# Patient Record
Sex: FEMALE | Race: WHITE | NOT HISPANIC OR LATINO | Employment: OTHER | URBAN - METROPOLITAN AREA
[De-identification: names, ages, dates, MRNs, and addresses within clinical notes are randomized per-mention and may not be internally consistent; named-entity substitution may affect disease eponyms.]

---

## 2017-03-03 ENCOUNTER — HOSPITAL ENCOUNTER (OUTPATIENT)
Dept: MAMMOGRAPHY | Facility: CLINIC | Age: 47
Discharge: HOME | End: 2017-03-03
Attending: OBSTETRICS & GYNECOLOGY | Admitting: OBSTETRICS & GYNECOLOGY
Payer: COMMERCIAL

## 2017-03-03 DIAGNOSIS — Z12.31 VISIT FOR SCREENING MAMMOGRAM: ICD-10-CM

## 2017-03-03 PROCEDURE — 77063 BREAST TOMOSYNTHESIS BI: CPT

## 2018-04-30 ENCOUNTER — HOSPITAL ENCOUNTER (OUTPATIENT)
Dept: MAMMOGRAPHY | Facility: CLINIC | Age: 48
Discharge: HOME | End: 2018-04-30
Attending: OBSTETRICS & GYNECOLOGY | Admitting: OBSTETRICS & GYNECOLOGY
Payer: COMMERCIAL

## 2018-04-30 DIAGNOSIS — Z12.31 VISIT FOR SCREENING MAMMOGRAM: ICD-10-CM

## 2018-04-30 PROCEDURE — 77067 SCR MAMMO BI INCL CAD: CPT

## 2019-05-14 ENCOUNTER — HOSPITAL ENCOUNTER (OUTPATIENT)
Dept: MAMMOGRAPHY | Facility: CLINIC | Age: 49
Discharge: HOME | End: 2019-05-14
Attending: OBSTETRICS & GYNECOLOGY | Admitting: OBSTETRICS & GYNECOLOGY
Payer: COMMERCIAL

## 2019-05-14 DIAGNOSIS — Z12.31 VISIT FOR SCREENING MAMMOGRAM: ICD-10-CM

## 2019-05-14 PROCEDURE — 77063 BREAST TOMOSYNTHESIS BI: CPT

## 2020-09-14 ENCOUNTER — HOSPITAL ENCOUNTER (OUTPATIENT)
Dept: MAMMOGRAPHY | Facility: CLINIC | Age: 50
Discharge: HOME | End: 2020-09-14
Attending: OBSTETRICS & GYNECOLOGY | Admitting: OBSTETRICS & GYNECOLOGY
Payer: COMMERCIAL

## 2020-09-14 DIAGNOSIS — Z12.31 VISIT FOR SCREENING MAMMOGRAM: ICD-10-CM

## 2020-09-14 PROCEDURE — 77063 BREAST TOMOSYNTHESIS BI: CPT

## 2021-09-23 ENCOUNTER — OFFICE VISIT (OUTPATIENT)
Dept: SURGERY | Facility: CLINIC | Age: 51
End: 2021-09-23
Payer: COMMERCIAL

## 2021-09-23 VITALS
HEART RATE: 62 BPM | WEIGHT: 145 LBS | HEIGHT: 66 IN | DIASTOLIC BLOOD PRESSURE: 60 MMHG | SYSTOLIC BLOOD PRESSURE: 100 MMHG | BODY MASS INDEX: 23.3 KG/M2

## 2021-09-23 DIAGNOSIS — K42.9 UMBILICAL HERNIA WITHOUT OBSTRUCTION AND WITHOUT GANGRENE: ICD-10-CM

## 2021-09-23 DIAGNOSIS — M62.08 DIASTASIS RECTI: Primary | ICD-10-CM

## 2021-09-23 PROCEDURE — 99203 OFFICE O/P NEW LOW 30 MIN: CPT | Performed by: SURGERY

## 2021-09-23 ASSESSMENT — MIFFLIN-ST. JEOR: SCORE: 1299.47

## 2021-09-23 NOTE — PROGRESS NOTES
"[facility]  Surgery Consultation    CONSULTATION REQUESTED BY:  Collette Valdez [phone]    HPI: Patient is a very pleasant 51-year-old female referred by the above provider for consultation regarding umbilical hernia and diastases recti.  She has had 3 pregnancies all delivered via .  She feels that she developed the umbilical hernia with her last pregnancy which was approximately 8 years ago.  She feels that the hernia has gotten slowly larger over time.  In addition to this she has significant core insufficiency.  She has difficulty performing sit ups.  She has chronic low back pain.  She has discomfort and pain along the rectus abdominis bilaterally.  She has lack of confidence in her abdominal/core muscles.  She does not have any GI symptoms.  No signs or symptoms that would suggest incarceration or strangulation.    PMH:   has a past medical history of Complication of anesthesia and Postpartum depression.  PSH:    has a past surgical history that includes Thyroidectomy and  section, tubal ligation, combined (2013).  Social History:   reports that she has never smoked. She has never used smokeless tobacco. She reports that she does not drink alcohol.  Family History:  family history includes Breast Cancer in her sister; Colon Cancer in her maternal grandmother and mother; Hypertension in her brother and mother; Substance Abuse in her father.  Medications/Allergies: Home medications and allergies reviewed.    ROS:  The 10 point Review of Systems is negative other than noted in the HPI.    Physical Exam:  /60   Pulse 62   Ht 1.676 m (5' 6\")   Wt 65.8 kg (145 lb)   BMI 23.40 kg/m    GENERAL: Generally appears well.  Psych: Alert and Oriented.  Normal affect  Eyes: Sclera clear  Respiratory:  Lungs clear to ausculation bilaterally with good air excursion  Cardiovascular:  Regular Rate and Rhythm with no murmurs gallops or rubs, normal peripheral pulses  GI: " Abdomen Non Distended Soft Mild tenderness to palpation along the margins of the rectus abdominis.  There is significant diastases particularly periumbilical but extending to a degree into the epigastric as well as infraumbilical regions.  Umbilical hernia palpated.  Hernia easily reduciable..  Lymphatic/Hematologic/Immune:  No femoral or cervical lymphadenopathy.  Integumentary:  No rashes  Neurological: grossly intact     All new lab and imaging data was reviewed.     Impression and Plan:  Patient is a 51 year old female with umbilical hernia with symptomatic diastases.    PLAN: I believe patient would be best served with abdominal wall reconstruction.  She is interested in possibly doing this coordinated with hysterectomy.  I told her that this would depend upon the technique of surgery used for her hysterectomy.  She is going to discuss this with her gynecologist and was encouraged to call back to arrange for scheduling.  I discussed the pathophysiology of hernias and options for repair including laparoscopic VS open. The risks associated with the procedure including, but not limited to, recurrence, nerve entrapment or injury, persistence of pain, injury to the bowel/bladder, infertility, hematoma, mesh migration, mesh infection, MI, and PE were discussed with the patient. She indicated understanding of the discussion, asked appropriate questions, and provided consent. Signs and symptoms of incarceration were discussed. If these develop in the interim, she promises to call or go straight to the ER. I have provided the patient with an information pamphlet.    Thank you very much for this consult.    John Rosales M.D.  [facility]  [phone]    Please route or send letter to:  Primary Care Provider (PCP) and Referring Provider

## 2021-09-23 NOTE — LETTER
2021          Collette Valdez MD  [facility]  [address]  [id number]  [place] [address]      RE:   Nohelia Wong 1970      Dear Colleague,    Thank you for referring your patient, Nohelia Wong, to [facility]. Please see a copy of my visit note below.    [facility]  Surgery Consultation     CONSULTATION REQUESTED BY:  Collette Valdez [phone]     HPI: Patient is a very pleasant 51-year-old female referred by the above provider for consultation regarding umbilical hernia and diastases recti.  She has had 3 pregnancies all delivered via .  She feels that she developed the umbilical hernia with her last pregnancy which was approximately 8 years ago.  She feels that the hernia has gotten slowly larger over time.  In addition to this she has significant core insufficiency.  She has difficulty performing sit ups.  She has chronic low back pain.  She has discomfort and pain along the rectus abdominis bilaterally.  She has lack of confidence in her abdominal/core muscles.  She does not have any GI symptoms.  No signs or symptoms that would suggest incarceration or strangulation.     PMH:   has a past medical history of Complication of anesthesia and Postpartum depression.  PSH:    has a past surgical history that includes Thyroidectomy and  section, tubal ligation, combined (2013).  Social History:   reports that she has never smoked. She has never used smokeless tobacco. She reports that she does not drink alcohol.  Family History:  family history includes Breast Cancer in her sister; Colon Cancer in her maternal grandmother and mother; Hypertension in her brother and mother; Substance Abuse in her father.  Medications/Allergies: Home medications and allergies reviewed.     ROS:  The 10 point Review of Systems is negative other than noted in the HPI.     Physical Exam:  /60   Pulse 62   Ht 1.676  "m (5' 6\")   Wt 65.8 kg (145 lb)   BMI 23.40 kg/m    GENERAL: Generally appears well.  Psych: Alert and Oriented.  Normal affect  Eyes: Sclera clear  Respiratory:  Lungs clear to ausculation bilaterally with good air excursion  Cardiovascular:  Regular Rate and Rhythm with no murmurs gallops or rubs, normal peripheral pulses  GI: Abdomen Non Distended Soft Mild tenderness to palpation along the margins of the rectus abdominis.  There is significant diastases particularly periumbilical but extending to a degree into the epigastric as well as infraumbilical regions.  Umbilical hernia palpated.  Hernia easily reduciable..  Lymphatic/Hematologic/Immune:  No femoral or cervical lymphadenopathy.  Integumentary:  No rashes  Neurological: grossly intact      All new lab and imaging data was reviewed.      Impression and Plan:  Patient is a 51 year old female with umbilical hernia with symptomatic diastases.     PLAN: I believe patient would be best served with abdominal wall reconstruction.  She is interested in possibly doing this coordinated with hysterectomy.  I told her that this would depend upon the technique of surgery used for her hysterectomy.  She is going to discuss this with her gynecologist and was encouraged to call back to arrange for scheduling.  I discussed the pathophysiology of hernias and options for repair including laparoscopic VS open. The risks associated with the procedure including, but not limited to, recurrence, nerve entrapment or injury, persistence of pain, injury to the bowel/bladder, infertility, hematoma, mesh migration, mesh infection, MI, and PE were discussed with the patient. She indicated understanding of the discussion, asked appropriate questions, and provided consent. Signs and symptoms of incarceration were discussed. If these develop in the interim, she promises to call or go straight to the ER. I have provided the patient with an information pamphlet.       Again, thank you for " allowing me to participate in the care of your patient.      Sincerely,      John Rosales MD

## 2021-10-24 ENCOUNTER — TELEPHONE (OUTPATIENT)
Dept: SURGERY | Facility: CLINIC | Age: 51
End: 2021-10-24

## 2021-10-24 NOTE — TELEPHONE ENCOUNTER
Type of surgery: abdominal wall reconstruction combined with Dr Alonso robotic hysterectomy laith salpingo-oophorectomy  Location of surgery: [facility]  Date and time of surgery: 12/14/21 8:00am  Surgeon: Dr Rosales  Pre-Op Appt Date: pt to schedule  Post-Op Appt Date: pt to schedule   Packet sent out: Yes  Pre-cert/Authorization completed:  Not Applicable  Date: 10/15/21

## 2021-11-19 DIAGNOSIS — Z11.59 ENCOUNTER FOR SCREENING FOR OTHER VIRAL DISEASES: ICD-10-CM

## 2021-12-09 RX ORDER — ACETAMINOPHEN 325 MG/1
975 TABLET ORAL ONCE
Status: CANCELLED | OUTPATIENT
Start: 2021-12-09 | End: 2021-12-09

## 2021-12-09 RX ORDER — CEFAZOLIN SODIUM 2 G/100ML
2 INJECTION, SOLUTION INTRAVENOUS SEE ADMIN INSTRUCTIONS
Status: CANCELLED | OUTPATIENT
Start: 2021-12-09

## 2021-12-09 RX ORDER — CEFAZOLIN SODIUM 2 G/100ML
2 INJECTION, SOLUTION INTRAVENOUS
Status: CANCELLED | OUTPATIENT
Start: 2021-12-09

## 2021-12-10 ENCOUNTER — OFFICE VISIT (OUTPATIENT)
Dept: SURGERY | Facility: CLINIC | Age: 51
End: 2021-12-10
Payer: COMMERCIAL

## 2021-12-10 DIAGNOSIS — Z11.59 ENCOUNTER FOR SCREENING FOR OTHER VIRAL DISEASES: ICD-10-CM

## 2021-12-10 PROCEDURE — U0003 INFECTIOUS AGENT DETECTION BY NUCLEIC ACID (DNA OR RNA); SEVERE ACUTE RESPIRATORY SYNDROME CORONAVIRUS 2 (SARS-COV-2) (CORONAVIRUS DISEASE [COVID-19]), AMPLIFIED PROBE TECHNIQUE, MAKING USE OF HIGH THROUGHPUT TECHNOLOGIES AS DESCRIBED BY CMS-2020-01-R: HCPCS

## 2021-12-10 PROCEDURE — 99207 PR NO CHARGE NURSE ONLY: CPT

## 2021-12-10 PROCEDURE — U0005 INFEC AGEN DETEC AMPLI PROBE: HCPCS

## 2021-12-10 NOTE — PROGRESS NOTES
Patient seen in clinic for asymptomatic Pre-Surgery COVID test.    Patient swabbed via Nasopharyngeal Swab.  Specimen brought to hospital lab for  .    Patient educated to self-quarantine from now until surgery.

## 2021-12-11 LAB — SARS-COV-2 RNA RESP QL NAA+PROBE: NEGATIVE

## 2021-12-13 RX ORDER — NAPROXEN SODIUM 220 MG
220-440 TABLET ORAL 2 TIMES DAILY PRN
COMMUNITY

## 2021-12-13 RX ORDER — PHENOL 1.4 %
10 AEROSOL, SPRAY (ML) MUCOUS MEMBRANE
COMMUNITY

## 2021-12-13 RX ORDER — FERROUS SULFATE 325(65) MG
325 TABLET ORAL DAILY
COMMUNITY

## 2021-12-14 DIAGNOSIS — Z11.59 ENCOUNTER FOR SCREENING FOR OTHER VIRAL DISEASES: Primary | ICD-10-CM

## 2022-02-08 ENCOUNTER — TELEPHONE (OUTPATIENT)
Dept: SURGERY | Facility: CLINIC | Age: 52
End: 2022-02-08
Payer: COMMERCIAL

## 2022-02-08 NOTE — TELEPHONE ENCOUNTER
[facility]  Post op call note  February 8, 2022       Nohelia Wong called our clinic today for concerns about what to expect for recovery. Her  will be out of town 8-10 weeks following surgery and she is expected to take care of her mother-in-law. This includes lifts/assists. I recommended a back-up plan for assistance with her mother-in-law as she likely will still be very sore from surgery and deconditioned. She expressed understanding and will discuss this with her family.      Ingrid Harris PA-C  [facility]  [phone]

## 2022-02-08 NOTE — TELEPHONE ENCOUNTER
Name of caller: Patient    Reason for Call:  Scheduled for abdominal wall reconstruction on 3/21 and has questions about rehab.  Wondering what to expect for recovery and limitations    Surgeon:  Dr. Rosales     Recent Surgery:  No    If yes, when & what type:  N/A      Best phone number to reach pt at is: [phone]  Ok to leave a message with medical info? Yes.    Pharmacy preferred (if calling for a refill): N/A

## 2022-03-08 ENCOUNTER — TELEPHONE (OUTPATIENT)
Dept: SURGERY | Facility: CLINIC | Age: 52
End: 2022-03-08
Payer: COMMERCIAL

## 2022-03-08 NOTE — TELEPHONE ENCOUNTER
Name of caller: Patient    Reason for Call:  Scheduled for abdominal wall reconstruction on 3/21.  Wondering what to expect for recovery at home - wants to make sure her  is prepared to care for her    Surgeon:  Dr. Rosales     Best phone number to reach pt at is: [phone]    Ok to leave a message with medical info? Yes.

## 2022-03-08 NOTE — TELEPHONE ENCOUNTER
[facility]  Post op call note  March 8, 2022         Nohelia Wong called our clinic today for concerns about what to expect for recovery. She is educated that she may stay 1-2 days in the hospital following surgery from General Surgery standpoint. Here she may have her  as a visitor and he will receive drain education while in house. She will have a follow up with Dr. Rosales approximately 2 weeks from surgical date for drain removal. She is understanding that she has lifting restrictions following surgery and it would be smart to stay to main level of home 1-2 weeks following surgery for comfort.        Ingrid Harris PA-C  [facility]  [phone]

## 2022-03-17 ENCOUNTER — LAB (OUTPATIENT)
Dept: URGENT CARE | Facility: URGENT CARE | Age: 52
End: 2022-03-17
Attending: SURGERY
Payer: COMMERCIAL

## 2022-03-17 DIAGNOSIS — Z11.59 ENCOUNTER FOR SCREENING FOR OTHER VIRAL DISEASES: ICD-10-CM

## 2022-03-17 PROCEDURE — U0003 INFECTIOUS AGENT DETECTION BY NUCLEIC ACID (DNA OR RNA); SEVERE ACUTE RESPIRATORY SYNDROME CORONAVIRUS 2 (SARS-COV-2) (CORONAVIRUS DISEASE [COVID-19]), AMPLIFIED PROBE TECHNIQUE, MAKING USE OF HIGH THROUGHPUT TECHNOLOGIES AS DESCRIBED BY CMS-2020-01-R: HCPCS

## 2022-03-17 PROCEDURE — U0005 INFEC AGEN DETEC AMPLI PROBE: HCPCS

## 2022-03-18 LAB — SARS-COV-2 RNA RESP QL NAA+PROBE: NEGATIVE

## 2022-03-20 ENCOUNTER — ANESTHESIA EVENT (OUTPATIENT)
Dept: SURGERY | Facility: CLINIC | Age: 52
DRG: 743 | End: 2022-03-20
Payer: COMMERCIAL

## 2022-03-20 ASSESSMENT — COPD QUESTIONNAIRES: COPD: 0

## 2022-03-20 ASSESSMENT — LIFESTYLE VARIABLES: TOBACCO_USE: 0

## 2022-03-21 ENCOUNTER — ANESTHESIA (OUTPATIENT)
Dept: SURGERY | Facility: CLINIC | Age: 52
DRG: 743 | End: 2022-03-21
Payer: COMMERCIAL

## 2022-03-21 ENCOUNTER — HOSPITAL ENCOUNTER (INPATIENT)
Facility: CLINIC | Age: 52
LOS: 1 days | Discharge: HOME | DRG: 743 | End: 2022-03-23
Attending: SURGERY | Admitting: SURGERY
Payer: COMMERCIAL

## 2022-03-21 ENCOUNTER — APPOINTMENT (OUTPATIENT)
Dept: SURGERY | Facility: PHYSICIAN GROUP | Age: 52
End: 2022-03-21
Payer: COMMERCIAL

## 2022-03-21 DIAGNOSIS — K42.9 UMBILICAL HERNIA WITHOUT OBSTRUCTION AND WITHOUT GANGRENE: Primary | ICD-10-CM

## 2022-03-21 LAB
ABO/RH(D): NORMAL
ANTIBODY SCREEN: NEGATIVE
B-HCG SERPL-ACNC: <1 IU/L (ref 0–5)
CREAT SERPL-MCNC: 0.7 MG/DL (ref 0.52–1.04)
GFR SERPL CREATININE-BSD FRML MDRD: >90 ML/MIN/1.73M2
HGB BLD-MCNC: 9 G/DL (ref 11.7–15.7)
HGB BLD-MCNC: 9.2 G/DL (ref 11.7–15.7)
PLATELET # BLD AUTO: 178 10E3/UL (ref 150–450)
SPECIMEN EXPIRATION DATE: NORMAL

## 2022-03-21 PROCEDURE — 0UT94ZZ RESECTION OF UTERUS, PERCUTANEOUS ENDOSCOPIC APPROACH: ICD-10-PCS | Performed by: OBSTETRICS & GYNECOLOGY

## 2022-03-21 PROCEDURE — 999N000141 HC STATISTIC PRE-PROCEDURE NURSING ASSESSMENT: Performed by: SURGERY

## 2022-03-21 PROCEDURE — 250N000011 HC RX IP 250 OP 636: Performed by: ANESTHESIOLOGY

## 2022-03-21 PROCEDURE — 8E0W4CZ ROBOTIC ASSISTED PROCEDURE OF TRUNK REGION, PERCUTANEOUS ENDOSCOPIC APPROACH: ICD-10-PCS | Performed by: OBSTETRICS & GYNECOLOGY

## 2022-03-21 PROCEDURE — 85018 HEMOGLOBIN: CPT | Performed by: PHYSICIAN ASSISTANT

## 2022-03-21 PROCEDURE — 85049 AUTOMATED PLATELET COUNT: CPT | Performed by: SURGERY

## 2022-03-21 PROCEDURE — 0UT74ZZ RESECTION OF BILATERAL FALLOPIAN TUBES, PERCUTANEOUS ENDOSCOPIC APPROACH: ICD-10-PCS | Performed by: OBSTETRICS & GYNECOLOGY

## 2022-03-21 PROCEDURE — 86901 BLOOD TYPING SEROLOGIC RH(D): CPT | Performed by: OBSTETRICS & GYNECOLOGY

## 2022-03-21 PROCEDURE — 250N000011 HC RX IP 250 OP 636: Performed by: PHYSICIAN ASSISTANT

## 2022-03-21 PROCEDURE — 86850 RBC ANTIBODY SCREEN: CPT | Performed by: OBSTETRICS & GYNECOLOGY

## 2022-03-21 PROCEDURE — 82565 ASSAY OF CREATININE: CPT | Performed by: PHYSICIAN ASSISTANT

## 2022-03-21 PROCEDURE — C1781 MESH (IMPLANTABLE): HCPCS | Performed by: SURGERY

## 2022-03-21 PROCEDURE — 36415 COLL VENOUS BLD VENIPUNCTURE: CPT | Performed by: PHYSICIAN ASSISTANT

## 2022-03-21 PROCEDURE — 0UT24ZZ RESECTION OF BILATERAL OVARIES, PERCUTANEOUS ENDOSCOPIC APPROACH: ICD-10-PCS | Performed by: OBSTETRICS & GYNECOLOGY

## 2022-03-21 PROCEDURE — 258N000003 HC RX IP 258 OP 636: Performed by: ANESTHESIOLOGY

## 2022-03-21 PROCEDURE — 250N000009 HC RX 250: Performed by: ANESTHESIOLOGY

## 2022-03-21 PROCEDURE — 49585 PR REPAIR UMBILICAL HERN,5+Y/O,REDUC: CPT | Mod: AS | Performed by: PHYSICIAN ASSISTANT

## 2022-03-21 PROCEDURE — 250N000013 HC RX MED GY IP 250 OP 250 PS 637: Performed by: PHYSICIAN ASSISTANT

## 2022-03-21 PROCEDURE — 272N000001 HC OR GENERAL SUPPLY STERILE: Performed by: SURGERY

## 2022-03-21 PROCEDURE — 370N000017 HC ANESTHESIA TECHNICAL FEE, PER MIN: Performed by: SURGERY

## 2022-03-21 PROCEDURE — 250N000009 HC RX 250: Performed by: OBSTETRICS & GYNECOLOGY

## 2022-03-21 PROCEDURE — 360N000080 HC SURGERY LEVEL 7, PER MIN: Performed by: SURGERY

## 2022-03-21 PROCEDURE — 88307 TISSUE EXAM BY PATHOLOGIST: CPT | Mod: TC | Performed by: OBSTETRICS & GYNECOLOGY

## 2022-03-21 PROCEDURE — 49585 PR REPAIR UMBILICAL HERN,5+Y/O,REDUC: CPT | Performed by: SURGERY

## 2022-03-21 PROCEDURE — 0UTC4ZZ RESECTION OF CERVIX, PERCUTANEOUS ENDOSCOPIC APPROACH: ICD-10-PCS | Performed by: OBSTETRICS & GYNECOLOGY

## 2022-03-21 PROCEDURE — 710N000009 HC RECOVERY PHASE 1, LEVEL 1, PER MIN: Performed by: SURGERY

## 2022-03-21 PROCEDURE — 258N000001 HC RX 258: Performed by: OBSTETRICS & GYNECOLOGY

## 2022-03-21 PROCEDURE — 250N000025 HC SEVOFLURANE, PER MIN: Performed by: SURGERY

## 2022-03-21 PROCEDURE — 15734 MUSCLE-SKIN GRAFT TRUNK: CPT | Mod: 59 | Performed by: SURGERY

## 2022-03-21 PROCEDURE — 88307 TISSUE EXAM BY PATHOLOGIST: CPT | Mod: 26 | Performed by: PATHOLOGY

## 2022-03-21 PROCEDURE — 0WUF0JZ SUPPLEMENT ABDOMINAL WALL WITH SYNTHETIC SUBSTITUTE, OPEN APPROACH: ICD-10-PCS | Performed by: SURGERY

## 2022-03-21 PROCEDURE — 84702 CHORIONIC GONADOTROPIN TEST: CPT | Performed by: PHYSICIAN ASSISTANT

## 2022-03-21 DEVICE — COMPOSITE MESH,MONOFILAMENT POLYESTER WITH ABSORBABLE COLLAGEN FILM AND MARKING
Type: IMPLANTABLE DEVICE | Site: ABDOMEN | Status: FUNCTIONAL
Brand: SYMBOTEX

## 2022-03-21 RX ORDER — CEFAZOLIN SODIUM/WATER 2 G/20 ML
2 SYRINGE (ML) INTRAVENOUS
Status: COMPLETED | OUTPATIENT
Start: 2022-03-21 | End: 2022-03-21

## 2022-03-21 RX ORDER — NALOXONE HYDROCHLORIDE 0.4 MG/ML
0.4 INJECTION, SOLUTION INTRAMUSCULAR; INTRAVENOUS; SUBCUTANEOUS
Status: DISCONTINUED | OUTPATIENT
Start: 2022-03-21 | End: 2022-03-23 | Stop reason: HOSPADM

## 2022-03-21 RX ORDER — ACETAMINOPHEN 325 MG/1
975 TABLET ORAL EVERY 8 HOURS
Status: DISCONTINUED | OUTPATIENT
Start: 2022-03-21 | End: 2022-03-23 | Stop reason: HOSPADM

## 2022-03-21 RX ORDER — CEFAZOLIN SODIUM/WATER 2 G/20 ML
2 SYRINGE (ML) INTRAVENOUS
Status: DISCONTINUED | OUTPATIENT
Start: 2022-03-21 | End: 2022-03-21 | Stop reason: HOSPADM

## 2022-03-21 RX ORDER — CYCLOBENZAPRINE HCL 10 MG
10 TABLET ORAL 3 TIMES DAILY
Status: DISCONTINUED | OUTPATIENT
Start: 2022-03-21 | End: 2022-03-23 | Stop reason: HOSPADM

## 2022-03-21 RX ORDER — OXYCODONE HYDROCHLORIDE 5 MG/1
10 TABLET ORAL EVERY 4 HOURS PRN
Status: DISCONTINUED | OUTPATIENT
Start: 2022-03-21 | End: 2022-03-23 | Stop reason: HOSPADM

## 2022-03-21 RX ORDER — LIDOCAINE HYDROCHLORIDE 20 MG/ML
INJECTION, SOLUTION INFILTRATION; PERINEURAL PRN
Status: DISCONTINUED | OUTPATIENT
Start: 2022-03-21 | End: 2022-03-21

## 2022-03-21 RX ORDER — ONDANSETRON 4 MG/1
4 TABLET, ORALLY DISINTEGRATING ORAL EVERY 30 MIN PRN
Status: DISCONTINUED | OUTPATIENT
Start: 2022-03-21 | End: 2022-03-21 | Stop reason: HOSPADM

## 2022-03-21 RX ORDER — FAMOTIDINE 20 MG/1
20 TABLET, FILM COATED ORAL 2 TIMES DAILY
Status: DISCONTINUED | OUTPATIENT
Start: 2022-03-21 | End: 2022-03-23 | Stop reason: HOSPADM

## 2022-03-21 RX ORDER — SODIUM CHLORIDE, SODIUM LACTATE, POTASSIUM CHLORIDE, CALCIUM CHLORIDE 600; 310; 30; 20 MG/100ML; MG/100ML; MG/100ML; MG/100ML
INJECTION, SOLUTION INTRAVENOUS CONTINUOUS
Status: DISCONTINUED | OUTPATIENT
Start: 2022-03-21 | End: 2022-03-21 | Stop reason: HOSPADM

## 2022-03-21 RX ORDER — FENTANYL CITRATE 0.05 MG/ML
50 INJECTION, SOLUTION INTRAMUSCULAR; INTRAVENOUS
Status: DISCONTINUED | OUTPATIENT
Start: 2022-03-21 | End: 2022-03-21 | Stop reason: HOSPADM

## 2022-03-21 RX ORDER — CEFAZOLIN SODIUM 2 G/100ML
2 INJECTION, SOLUTION INTRAVENOUS EVERY 8 HOURS
Status: COMPLETED | OUTPATIENT
Start: 2022-03-21 | End: 2022-03-22

## 2022-03-21 RX ORDER — SODIUM CHLORIDE, SODIUM LACTATE, POTASSIUM CHLORIDE, CALCIUM CHLORIDE 600; 310; 30; 20 MG/100ML; MG/100ML; MG/100ML; MG/100ML
INJECTION, SOLUTION INTRAVENOUS CONTINUOUS
Status: DISCONTINUED | OUTPATIENT
Start: 2022-03-21 | End: 2022-03-22

## 2022-03-21 RX ORDER — AMOXICILLIN 250 MG
1 CAPSULE ORAL 2 TIMES DAILY
Status: DISCONTINUED | OUTPATIENT
Start: 2022-03-21 | End: 2022-03-23 | Stop reason: HOSPADM

## 2022-03-21 RX ORDER — LIDOCAINE 40 MG/G
CREAM TOPICAL
Status: DISCONTINUED | OUTPATIENT
Start: 2022-03-21 | End: 2022-03-23 | Stop reason: HOSPADM

## 2022-03-21 RX ORDER — HYDROMORPHONE HCL IN WATER/PF 6 MG/30 ML
0.4 PATIENT CONTROLLED ANALGESIA SYRINGE INTRAVENOUS
Status: DISCONTINUED | OUTPATIENT
Start: 2022-03-21 | End: 2022-03-23 | Stop reason: HOSPADM

## 2022-03-21 RX ORDER — KETOROLAC TROMETHAMINE 30 MG/ML
30 INJECTION, SOLUTION INTRAMUSCULAR; INTRAVENOUS EVERY 6 HOURS PRN
Status: DISCONTINUED | OUTPATIENT
Start: 2022-03-21 | End: 2022-03-23 | Stop reason: HOSPADM

## 2022-03-21 RX ORDER — BUPIVACAINE HYDROCHLORIDE 5 MG/ML
INJECTION, SOLUTION PERINEURAL PRN
Status: DISCONTINUED | OUTPATIENT
Start: 2022-03-21 | End: 2022-03-21 | Stop reason: HOSPADM

## 2022-03-21 RX ORDER — CEFAZOLIN SODIUM/WATER 2 G/20 ML
2 SYRINGE (ML) INTRAVENOUS SEE ADMIN INSTRUCTIONS
Status: DISCONTINUED | OUTPATIENT
Start: 2022-03-21 | End: 2022-03-21 | Stop reason: HOSPADM

## 2022-03-21 RX ORDER — HYDROMORPHONE HCL IN WATER/PF 6 MG/30 ML
0.2 PATIENT CONTROLLED ANALGESIA SYRINGE INTRAVENOUS EVERY 5 MIN PRN
Status: DISCONTINUED | OUTPATIENT
Start: 2022-03-21 | End: 2022-03-21 | Stop reason: HOSPADM

## 2022-03-21 RX ORDER — LEVOTHYROXINE SODIUM 100 UG/1
100 TABLET ORAL
Status: DISCONTINUED | OUTPATIENT
Start: 2022-03-21 | End: 2022-03-23 | Stop reason: HOSPADM

## 2022-03-21 RX ORDER — PROPOFOL 10 MG/ML
INJECTION, EMULSION INTRAVENOUS CONTINUOUS PRN
Status: DISCONTINUED | OUTPATIENT
Start: 2022-03-21 | End: 2022-03-21

## 2022-03-21 RX ORDER — FENTANYL CITRATE 0.05 MG/ML
25 INJECTION, SOLUTION INTRAMUSCULAR; INTRAVENOUS EVERY 5 MIN PRN
Status: DISCONTINUED | OUTPATIENT
Start: 2022-03-21 | End: 2022-03-21 | Stop reason: HOSPADM

## 2022-03-21 RX ORDER — HYDROMORPHONE HCL IN WATER/PF 6 MG/30 ML
0.2 PATIENT CONTROLLED ANALGESIA SYRINGE INTRAVENOUS
Status: DISCONTINUED | OUTPATIENT
Start: 2022-03-21 | End: 2022-03-23 | Stop reason: HOSPADM

## 2022-03-21 RX ORDER — ONDANSETRON 2 MG/ML
4 INJECTION INTRAMUSCULAR; INTRAVENOUS EVERY 6 HOURS PRN
Status: DISCONTINUED | OUTPATIENT
Start: 2022-03-21 | End: 2022-03-23 | Stop reason: HOSPADM

## 2022-03-21 RX ORDER — ONDANSETRON 4 MG/1
4 TABLET, ORALLY DISINTEGRATING ORAL EVERY 6 HOURS PRN
Status: DISCONTINUED | OUTPATIENT
Start: 2022-03-21 | End: 2022-03-23 | Stop reason: HOSPADM

## 2022-03-21 RX ORDER — OXYCODONE HYDROCHLORIDE 5 MG/1
5 TABLET ORAL EVERY 4 HOURS PRN
Status: DISCONTINUED | OUTPATIENT
Start: 2022-03-21 | End: 2022-03-23 | Stop reason: HOSPADM

## 2022-03-21 RX ORDER — ONDANSETRON 2 MG/ML
4 INJECTION INTRAMUSCULAR; INTRAVENOUS EVERY 30 MIN PRN
Status: DISCONTINUED | OUTPATIENT
Start: 2022-03-21 | End: 2022-03-21 | Stop reason: HOSPADM

## 2022-03-21 RX ORDER — GLYCOPYRROLATE 0.2 MG/ML
INJECTION, SOLUTION INTRAMUSCULAR; INTRAVENOUS PRN
Status: DISCONTINUED | OUTPATIENT
Start: 2022-03-21 | End: 2022-03-21

## 2022-03-21 RX ORDER — DIAZEPAM 10 MG/2ML
2.5 INJECTION, SOLUTION INTRAMUSCULAR; INTRAVENOUS EVERY 4 HOURS PRN
Status: DISCONTINUED | OUTPATIENT
Start: 2022-03-21 | End: 2022-03-22

## 2022-03-21 RX ORDER — NEOSTIGMINE METHYLSULFATE 1 MG/ML
VIAL (ML) INJECTION PRN
Status: DISCONTINUED | OUTPATIENT
Start: 2022-03-21 | End: 2022-03-21

## 2022-03-21 RX ORDER — OXYCODONE HYDROCHLORIDE 5 MG/1
5 TABLET ORAL EVERY 4 HOURS PRN
Status: DISCONTINUED | OUTPATIENT
Start: 2022-03-21 | End: 2022-03-21 | Stop reason: HOSPADM

## 2022-03-21 RX ORDER — PROCHLORPERAZINE MALEATE 10 MG
10 TABLET ORAL EVERY 6 HOURS PRN
Status: DISCONTINUED | OUTPATIENT
Start: 2022-03-21 | End: 2022-03-23 | Stop reason: HOSPADM

## 2022-03-21 RX ORDER — PROPOFOL 10 MG/ML
INJECTION, EMULSION INTRAVENOUS PRN
Status: DISCONTINUED | OUTPATIENT
Start: 2022-03-21 | End: 2022-03-21

## 2022-03-21 RX ORDER — ACETAMINOPHEN 325 MG/1
650 TABLET ORAL EVERY 4 HOURS PRN
Status: DISCONTINUED | OUTPATIENT
Start: 2022-03-24 | End: 2022-03-23 | Stop reason: HOSPADM

## 2022-03-21 RX ORDER — DEXAMETHASONE SODIUM PHOSPHATE 4 MG/ML
INJECTION, SOLUTION INTRA-ARTICULAR; INTRALESIONAL; INTRAMUSCULAR; INTRAVENOUS; SOFT TISSUE PRN
Status: DISCONTINUED | OUTPATIENT
Start: 2022-03-21 | End: 2022-03-21

## 2022-03-21 RX ORDER — ACETAMINOPHEN 325 MG/1
975 TABLET ORAL ONCE
Status: COMPLETED | OUTPATIENT
Start: 2022-03-21 | End: 2022-03-21

## 2022-03-21 RX ORDER — NALOXONE HYDROCHLORIDE 0.4 MG/ML
0.2 INJECTION, SOLUTION INTRAMUSCULAR; INTRAVENOUS; SUBCUTANEOUS
Status: DISCONTINUED | OUTPATIENT
Start: 2022-03-21 | End: 2022-03-23 | Stop reason: HOSPADM

## 2022-03-21 RX ORDER — BISACODYL 10 MG
10 SUPPOSITORY, RECTAL RECTAL DAILY PRN
Status: DISCONTINUED | OUTPATIENT
Start: 2022-03-21 | End: 2022-03-23 | Stop reason: HOSPADM

## 2022-03-21 RX ORDER — SCOLOPAMINE TRANSDERMAL SYSTEM 1 MG/1
1 PATCH, EXTENDED RELEASE TRANSDERMAL ONCE
Status: COMPLETED | OUTPATIENT
Start: 2022-03-21 | End: 2022-03-22

## 2022-03-21 RX ORDER — MAGNESIUM HYDROXIDE 1200 MG/15ML
LIQUID ORAL PRN
Status: DISCONTINUED | OUTPATIENT
Start: 2022-03-21 | End: 2022-03-21 | Stop reason: HOSPADM

## 2022-03-21 RX ORDER — FENTANYL CITRATE 50 UG/ML
INJECTION, SOLUTION INTRAMUSCULAR; INTRAVENOUS PRN
Status: DISCONTINUED | OUTPATIENT
Start: 2022-03-21 | End: 2022-03-21

## 2022-03-21 RX ORDER — POLYETHYLENE GLYCOL 3350 17 G/17G
17 POWDER, FOR SOLUTION ORAL DAILY
Status: DISCONTINUED | OUTPATIENT
Start: 2022-03-22 | End: 2022-03-23 | Stop reason: HOSPADM

## 2022-03-21 RX ORDER — ONDANSETRON 2 MG/ML
INJECTION INTRAMUSCULAR; INTRAVENOUS PRN
Status: DISCONTINUED | OUTPATIENT
Start: 2022-03-21 | End: 2022-03-21

## 2022-03-21 RX ADMIN — ACETAMINOPHEN 975 MG: 325 TABLET, FILM COATED ORAL at 15:51

## 2022-03-21 RX ADMIN — GLYCOPYRROLATE 0.7 MG: 0.2 INJECTION, SOLUTION INTRAMUSCULAR; INTRAVENOUS at 10:14

## 2022-03-21 RX ADMIN — PHENYLEPHRINE HYDROCHLORIDE 100 MCG: 10 INJECTION INTRAVENOUS at 09:30

## 2022-03-21 RX ADMIN — CYCLOBENZAPRINE 10 MG: 10 TABLET, FILM COATED ORAL at 19:50

## 2022-03-21 RX ADMIN — HYDROMORPHONE HYDROCHLORIDE 0.2 MG: 0.2 INJECTION, SOLUTION INTRAMUSCULAR; INTRAVENOUS; SUBCUTANEOUS at 15:57

## 2022-03-21 RX ADMIN — DEXAMETHASONE SODIUM PHOSPHATE 4 MG: 4 INJECTION, SOLUTION INTRA-ARTICULAR; INTRALESIONAL; INTRAMUSCULAR; INTRAVENOUS; SOFT TISSUE at 07:57

## 2022-03-21 RX ADMIN — FAMOTIDINE 20 MG: 20 TABLET ORAL at 21:50

## 2022-03-21 RX ADMIN — OXYCODONE HYDROCHLORIDE 10 MG: 5 TABLET ORAL at 19:50

## 2022-03-21 RX ADMIN — FENTANYL CITRATE 25 MCG: 50 INJECTION, SOLUTION INTRAMUSCULAR; INTRAVENOUS at 11:41

## 2022-03-21 RX ADMIN — ROCURONIUM BROMIDE 20 MG: 50 INJECTION, SOLUTION INTRAVENOUS at 08:47

## 2022-03-21 RX ADMIN — ROCURONIUM BROMIDE 10 MG: 50 INJECTION, SOLUTION INTRAVENOUS at 09:30

## 2022-03-21 RX ADMIN — PHENYLEPHRINE HYDROCHLORIDE 100 MCG: 10 INJECTION INTRAVENOUS at 08:03

## 2022-03-21 RX ADMIN — OXYCODONE HYDROCHLORIDE 10 MG: 5 TABLET ORAL at 15:50

## 2022-03-21 RX ADMIN — HYDROMORPHONE HYDROCHLORIDE 0.2 MG: 0.2 INJECTION, SOLUTION INTRAMUSCULAR; INTRAVENOUS; SUBCUTANEOUS at 12:06

## 2022-03-21 RX ADMIN — PROPOFOL 25 MCG/KG/MIN: 10 INJECTION, EMULSION INTRAVENOUS at 07:46

## 2022-03-21 RX ADMIN — CYCLOBENZAPRINE 10 MG: 10 TABLET, FILM COATED ORAL at 15:51

## 2022-03-21 RX ADMIN — ROCURONIUM BROMIDE 20 MG: 50 INJECTION, SOLUTION INTRAVENOUS at 09:01

## 2022-03-21 RX ADMIN — CYCLOBENZAPRINE 10 MG: 10 TABLET, FILM COATED ORAL at 12:17

## 2022-03-21 RX ADMIN — SENNOSIDES AND DOCUSATE SODIUM 1 TABLET: 50; 8.6 TABLET ORAL at 21:50

## 2022-03-21 RX ADMIN — SCOPALAMINE 1 PATCH: 1 PATCH, EXTENDED RELEASE TRANSDERMAL at 07:11

## 2022-03-21 RX ADMIN — KETOROLAC TROMETHAMINE 30 MG: 30 INJECTION, SOLUTION INTRAMUSCULAR; INTRAVENOUS at 12:12

## 2022-03-21 RX ADMIN — FENTANYL CITRATE 25 MCG: 50 INJECTION, SOLUTION INTRAMUSCULAR; INTRAVENOUS at 11:48

## 2022-03-21 RX ADMIN — SODIUM CHLORIDE, POTASSIUM CHLORIDE, SODIUM LACTATE AND CALCIUM CHLORIDE: 600; 310; 30; 20 INJECTION, SOLUTION INTRAVENOUS at 06:18

## 2022-03-21 RX ADMIN — ACETAMINOPHEN 975 MG: 325 TABLET ORAL at 06:21

## 2022-03-21 RX ADMIN — PROPOFOL 200 MG: 10 INJECTION, EMULSION INTRAVENOUS at 07:46

## 2022-03-21 RX ADMIN — MIDAZOLAM 2 MG: 1 INJECTION INTRAMUSCULAR; INTRAVENOUS at 07:37

## 2022-03-21 RX ADMIN — ROCURONIUM BROMIDE 10 MG: 50 INJECTION, SOLUTION INTRAVENOUS at 09:45

## 2022-03-21 RX ADMIN — HYDROMORPHONE HYDROCHLORIDE 0.5 MG: 1 INJECTION, SOLUTION INTRAMUSCULAR; INTRAVENOUS; SUBCUTANEOUS at 09:02

## 2022-03-21 RX ADMIN — ROCURONIUM BROMIDE 10 MG: 50 INJECTION, SOLUTION INTRAVENOUS at 09:15

## 2022-03-21 RX ADMIN — DEXMEDETOMIDINE HYDROCHLORIDE 12 MCG: 100 INJECTION, SOLUTION INTRAVENOUS at 08:08

## 2022-03-21 RX ADMIN — ONDANSETRON 4 MG: 2 INJECTION INTRAMUSCULAR; INTRAVENOUS at 10:50

## 2022-03-21 RX ADMIN — FENTANYL CITRATE 25 MCG: 50 INJECTION, SOLUTION INTRAMUSCULAR; INTRAVENOUS at 11:36

## 2022-03-21 RX ADMIN — CEFAZOLIN SODIUM 2 G: 2 INJECTION, SOLUTION INTRAVENOUS at 23:40

## 2022-03-21 RX ADMIN — PHENYLEPHRINE HYDROCHLORIDE 100 MCG: 10 INJECTION INTRAVENOUS at 09:38

## 2022-03-21 RX ADMIN — KETOROLAC TROMETHAMINE 30 MG: 30 INJECTION, SOLUTION INTRAMUSCULAR; INTRAVENOUS at 19:49

## 2022-03-21 RX ADMIN — Medication 2 G: at 07:37

## 2022-03-21 RX ADMIN — ROCURONIUM BROMIDE 50 MG: 50 INJECTION, SOLUTION INTRAVENOUS at 07:46

## 2022-03-21 RX ADMIN — FENTANYL CITRATE 25 MCG: 50 INJECTION, SOLUTION INTRAMUSCULAR; INTRAVENOUS at 11:53

## 2022-03-21 RX ADMIN — PHENYLEPHRINE HYDROCHLORIDE 100 MCG: 10 INJECTION INTRAVENOUS at 10:14

## 2022-03-21 RX ADMIN — SODIUM CHLORIDE, POTASSIUM CHLORIDE, SODIUM LACTATE AND CALCIUM CHLORIDE: 600; 310; 30; 20 INJECTION, SOLUTION INTRAVENOUS at 09:51

## 2022-03-21 RX ADMIN — HYDROMORPHONE HYDROCHLORIDE 0.2 MG: 0.2 INJECTION, SOLUTION INTRAMUSCULAR; INTRAVENOUS; SUBCUTANEOUS at 13:26

## 2022-03-21 RX ADMIN — SODIUM CHLORIDE, POTASSIUM CHLORIDE, SODIUM LACTATE AND CALCIUM CHLORIDE: 600; 310; 30; 20 INJECTION, SOLUTION INTRAVENOUS at 12:38

## 2022-03-21 RX ADMIN — DEXMEDETOMIDINE HYDROCHLORIDE 8 MCG: 100 INJECTION, SOLUTION INTRAVENOUS at 10:34

## 2022-03-21 RX ADMIN — LIDOCAINE HYDROCHLORIDE 100 MG: 20 INJECTION, SOLUTION INFILTRATION; PERINEURAL at 07:46

## 2022-03-21 RX ADMIN — PHENYLEPHRINE HYDROCHLORIDE 100 MCG: 10 INJECTION INTRAVENOUS at 07:56

## 2022-03-21 RX ADMIN — CEFAZOLIN SODIUM 2 G: 2 INJECTION, SOLUTION INTRAVENOUS at 15:51

## 2022-03-21 RX ADMIN — HYDROMORPHONE HYDROCHLORIDE 0.2 MG: 0.2 INJECTION, SOLUTION INTRAMUSCULAR; INTRAVENOUS; SUBCUTANEOUS at 22:27

## 2022-03-21 RX ADMIN — NEOSTIGMINE METHYLSULFATE 3.5 MG: 1 INJECTION, SOLUTION INTRAVENOUS at 10:14

## 2022-03-21 RX ADMIN — LEVOTHYROXINE SODIUM 100 MCG: 100 TABLET ORAL at 15:58

## 2022-03-21 RX ADMIN — ACETAMINOPHEN 975 MG: 325 TABLET, FILM COATED ORAL at 23:40

## 2022-03-21 RX ADMIN — ROCURONIUM BROMIDE 30 MG: 50 INJECTION, SOLUTION INTRAVENOUS at 08:13

## 2022-03-21 RX ADMIN — FENTANYL CITRATE 100 MCG: 50 INJECTION, SOLUTION INTRAMUSCULAR; INTRAVENOUS at 07:46

## 2022-03-21 RX ADMIN — HYDROMORPHONE HYDROCHLORIDE 0.2 MG: 0.2 INJECTION, SOLUTION INTRAMUSCULAR; INTRAVENOUS; SUBCUTANEOUS at 12:00

## 2022-03-21 NOTE — INTERVAL H&P NOTE
I have reviewed the surgical (or preoperative) H&P that is linked to this encounter, and examined the patient. There are no significant changes    Clinical Conditions Present on Arrival:  SECTIONPRESENTONADMISSIONBEGIN@

## 2022-03-21 NOTE — OP NOTE
Procedure Date: 2022    PREOPERATIVE DIAGNOSIS:  Symptomatic fibroid uterus.    POSTOPERATIVE DIAGNOSES:  Symptomatic fibroid uterus.    PROCEDURE PERFORMED:  Da Ihsan total laparoscopic hysterectomy, bilateral salpingo-oophorectomy.    SURGEON:  Cory Alonso MD    ASSISTANT:  Nighat Gamboa.    ANESTHESIA:  General.    ESTIMATED BLOOD LOSS:  20 mL    COMPLICATIONS:  None.    SPECIMENS:  Fibroid uterus with tubes and ovaries sent to pathology for permanent section.    FINDINGS:  The patient had some M-ensqyos-uzkeqlu scarring and had a left ovarian tubal cyst that appeared benign.  She had a posterior wall intramural fibroid that had a spongy consistency and could be partially necrosed.  Regardless, we were able to pull the specimen through the vagina without incident, and the vagina was closed, and she is ready for the second part of her procedure, which will be abdominal wall reconstruction with mesh in the hands of Dr. John Galloway.    DESCRIPTION OF PROCEDURE:  After obtaining informed consent, the patient was prepped and draped in the usual manner for an abdominal vaginal procedure.  A Caicedo catheter was inserted in the bladder and a VCare device was inserted in the uterus.  Gloves were changed and attention was turned to the abdomen.  We created an 8 mm umbilical skin incision with a Veress needle, insufflated to a pressure of 15.  I then placed the da Ihsan camera port, and under direct visualization, with my assistant, placed bilateral da Ihsan ports 10 cm from midline to the right and left.  The da Ihsan robot was then placed in 29 degrees Trendelenburg and the da Ihsan robot was docked.  My assistant placed hot brigida in the right hand and a fenestrated bipolar grasper on the left hand as I took my place at the operative console.    We located the left infundibulopelvic artery and vein and could easily see the left ureter.  We sealed and cut the IP ligament on the left using the fenestrated  bipolar grasper and then the scissors.  We worked stepwise until we made it through the left round ligament at its midpoint.  Once we were through the round ligament, we were in the broad ligament and we  this with blunt dissection into anterior and posterior leaves.  The posterior leaf was cut down to the cervical isthmus, and we carried this dissection across the front of the specimen and worked our way through the  scarring until we could see the white of the vaginal fascia.  I then swept the bladder flap down off of the VCare cup ring with my assistant moving the VCare cup ring and assisting me in that way.  I then cut the posterior leaf of the broad ligament down to the left uterosacral ligament.  This skeletonized the vasculature, which was sealed and cut to the cervical isthmus and then we sealed and cut the left parametria down to the VCare cup ring.  Isolated bleeders were electrocoagulated.  We then did a similar dissection on the patient's right side and without difficulty.  The bladder had been swept all the way down off the VCare cup ring.  We elevated the uterus and I held it up with my fenestrated bipolar grasper and was able to clearly see in the posterior fornix the delineation of the VCare cup.  I then cut against this, and we worked our way around the specimen until it was freed from the apex of the vagina.  I then had my assistant remove the VCare and insert a Mccollum single tooth tenaculum.  I passed the cervix into the tenaculum and my assistant was able to pull the specimen through the vagina.  Next, I attended to isolated bleeders at the cuff, and we did copious irrigation and suction.  Then my assistant passed a 2-0 Covidien V-Loc through the right armature and inserted the denton suture cut.  We closed the fascia of the cuff and reapproximated epithelium left to right in a running stitch.  I then used the remainder of the stitch to work right to left to reperitonealize and  reinforce the fascial closure.  Excess stitch was then cut at the left aspect of the cuff, and the needle was sent off the field.  Copious irrigation and suction occurred, and finding no further bleeders, the procedure was complete.  Pneumoperitoneum was let down.  Trocars were removed, and Dr. Galloway had asked us not to close any of the upper abdominal incision points.  He will address those as part of his abdominal reconstruction procedure.  The patient was being redraped in a supine position for the initiation of that case, which will follow this case.  I refer you to his operative note for further details.    DISPOSITION:  The patient is in satisfactory stable condition and is in recovery.    Cory Alonso MD        D: 2022   T: 2022   MT: NOE    Name:     ZENIA BLAIR  MRN:      [id number]        Account:        [id number]   :      1970           Procedure Date: 2022     Document: [id number]

## 2022-03-21 NOTE — DISCHARGE INSTRUCTIONS
[facility]  Discharge Instructions: Post-Operative Abdominal Wall Reconstruction    ACTIVITY    Take frequent, short walks and increase your activity gradually.      Avoid strenuous physical activity or heavy lifting greater than 15-20 lbs. for 4-6 weeks. You may climb stairs.     You may drive without restrictions when you are not using any prescription pain medication and feel comfortable in a car.    You may return to work/school when you are comfortable without any prescription pain medication.    You can wear the abdominal binder 24 hours per day for the first 3 days postoperatively. After the first 3 days you can wear the binder with activity or as you feel comfortable for the next several weeks. You can wash the abdominal binder in your washing machine and dry it on low heat in the dryer.     WOUND CARE    You may remove your outer dressing and shower 48 hours after the surgery.  Pat your incisions dry and leave them open to air.  Re-apply dressing (gauze/tape) as needed for comfort or drainage.    You have steri-strips (looks like white tape) at your incisions.  You may peel off the steri-strips 2 weeks after your surgery if they have not peeled off on their own.    Do not soak your incisions in a tub or pool for 2 weeks.     Do not apply any lotions, creams, or ointments to your incisions.    A ridge under your incisions is normal and will gradually resolve.    Wear the abdominal binder 24 hours per day for the first 3 days, then as you like for comfort.    DRAIN CARE    You have 2 TERRIE drains in place. In order to empty the drain, open the tab/vent on the drain and record how much liquid you remove. To properly close the drain, squeeze the bulb (with tab/vent open) then close the tab while still squeezing the bulb. You should notice that liquid moves in the tubing toward the bulb if it is properly closed.     You should also strip the drain tubing once daily to avoid  any clogging. You do this by gently pinching the drain, starting near the skin, and stretching the tubing out this way until you reach the bulb. Do not pull hard on the drain tubing to the point of pulling the drain away from the skin.    DIET    Start with liquids, then gradually resume your regular diet as tolerated.     Drink plenty of fluids to stay hydrated.    PAIN    Expect tenderness and discomfort at the incision site(s).  Use the prescribed pain medication at your discretion.  Expect gradual resolution of your pain over several days.    You may take ibuprofen with food (unless you have been told not to) instead of or in addition to your prescribed pain medication.      You also should take tylenol/acetaminophen in addition to your prescribed pain medication. Gradually transition off the prescription pain medication to Tylenol and/or Ibuprofen    Do not drink alcohol or drive while you are taking pain medications.    You may apply ice to your incisions in 20 minute intervals as needed for the next 48 hours.  After that time, you may switch to heat if you prefer.    EXPECTATIONS    Pain medications can cause constipation.  Limit use when possible.  Take over the counter stool softener/stimulant, such as Colace or Senna, 1-2 times a day with plenty of water.  You may take a mild over the counter laxative, such as Miralax or a suppository, as needed.      You may discontinue these medications once you are having regular bowel movements and/or are no longer taking your narcotic pain medication.    RETURN APPOINTMENT    Follow-up with your surgeon next week for recheck and drain removal. If your drain is ready to be removed before that time, please call to make an appointment sooner.  Please call our office at [phone] to schedule your appointment.    CALL OUR OFFICE [phone] IF YOU HAVE:     Chills or fever above 101 F.    Increased redness, warmth, or drainage at your incisions.    Significant  bleeding or swelling.    Pain not relieved by your pain medication or rest.    Increasing pain after the first 48 hours.    Any other concerns or questions.    **If you have concerns or questions about your procedure,    please contact Dr. Rosales at  [phone]**

## 2022-03-21 NOTE — PROGRESS NOTES
[facility]    General Surgery  Short Progress Note    Nohelia Wong underwent abdominal wall reconstruction with mesh, elevation of the skin and subcutaneous flaps by Dr. Rosales and robotic assisted total hysterectomy and bilateral salpingo-oophorectomy by Dr. Alonso. Once patient up and moving well, okay to remove greenberg today. Possible discharge as early as tomorrow if pain controlled with oral analgesia, tolerating diet, and vitals wnl.    Ingrid Harris PA-C

## 2022-03-21 NOTE — OR NURSING
Dr Milind andrews, updated on pt having pain. Ok to give Toradol 30 iv and give flexeril 10 mg tab. Eva reports having constant cramping and rates pain at at 5-6. Ok to transfer pt to floor when she reports pain at a 4.

## 2022-03-21 NOTE — ANESTHESIA PREPROCEDURE EVALUATION
Anesthesia Pre-Procedure Evaluation    Patient: Nohelia Wong   MRN: [id number] : 1970        Procedure : Procedure(s):  RECONSTRUCTION, ABDOMINAL WALL  ROBOTIC ASSISTED TOTAL HYSTERECTOMY, BILATERAL SALPINGO-OOPHORECTOMY          Past Medical History:   Diagnosis Date     Complication of anesthesia     vomiting and HA with Thyroid     Postpartum depression     first pregnancy      Past Surgical History:   Procedure Laterality Date      SECTION, TUBAL LIGATION, COMBINED  2013    Procedure: COMBINED  SECTION, TUBAL LIGATION;  REPEAT  SECTION WITH TUBAL LIGATION;  Surgeon: Collette Valdez MD;  Location:  L+D     THYROIDECTOMY        Allergies   Allergen Reactions     No Known Drug Allergies       Social History     Tobacco Use     Smoking status: Never Smoker     Smokeless tobacco: Never Used   Substance Use Topics     Alcohol use: No      Wt Readings from Last 1 Encounters:   21 65.8 kg (145 lb)        Anesthesia Evaluation   Pt has had prior anesthetic. Type: General.    History of anesthetic complications  - PONV.      ROS/MED HX  ENT/Pulmonary:    (-) tobacco use, asthma, COPD and sleep apnea   Neurologic:  - neg neurologic ROS     Cardiovascular:  - neg cardiovascular ROS     METS/Exercise Tolerance: >4 METS    Hematologic:     (+) anemia,     Musculoskeletal:       GI/Hepatic:    (-) GERD and liver disease   Renal/Genitourinary:    (-) renal disease   Endo:     (+) thyroid problem, hypothyroidism,  (-) Type I DM and Type II DM   Psychiatric/Substance Use:       Infectious Disease:       Malignancy:       Other:            Physical Exam    Airway        Mallampati: II   TM distance: > 3 FB   Neck ROM: full   Mouth opening: > 3 cm    Respiratory Devices and Support         Dental  no notable dental history         Cardiovascular   cardiovascular exam normal          Pulmonary   pulmonary exam normal                OUTSIDE LABS:  CBC:   Lab Results   Component  Value Date    HGB 9.9 (L) 01/08/2013    HGB 12.0 01/07/2013    HCT 40.6 06/13/2012     K 06/13/2012     07/29/2005     BMP: No results found for: NA, POTASSIUM, CHLORIDE, CO2, BUN, CR, GLC  COAGS: No results found for: PTT, INR, FIBR  POC: No results found for: BGM, HCG, HCGS  HEPATIC: No results found for: ALBUMIN, PROTTOTAL, ALT, AST, GGT, ALKPHOS, BILITOTAL, BILIDIRECT, DEVORAH  OTHER:   Lab Results   Component Value Date    KRISTEN 7.9 (L) 08/27/2008    TSH 2.43 10/23/2012       Anesthesia Plan    ASA Status:  1   NPO Status:  NPO Appropriate    Anesthesia Type: General.     - Airway: ETT   Induction: Intravenous.   Maintenance: Balanced.   Techniques and Equipment:     - Lines/Monitors: 2nd IV     Consents    Anesthesia Plan(s) and associated risks, benefits, and realistic alternatives discussed. Questions answered and patient/representative(s) expressed understanding.    - Discussed:     - Discussed with:  Patient      - Extended Intubation/Ventilatory Support Discussed: No.      - Patient is DNR/DNI Status: No    Use of blood products discussed: No .     Postoperative Care    Pain management: IV analgesics, Multi-modal analgesia.     - Plan for long acting post-op opioid use   PONV prophylaxis: Ondansetron (or other 5HT-3), Dexamethasone or Solumedrol, Background Propofol Infusion, Scopolamine patch     Comments:    Other Comments: Hgb 10.7            Aly Vaz MD

## 2022-03-21 NOTE — ANESTHESIA POSTPROCEDURE EVALUATION
Patient: Nohelia Wong    Procedure: Procedure(s):  RECONSTRUCTION, ABDOMINAL WALL  ROBOTIC ASSISTED TOTAL HYSTERECTOMY, BILATERAL SALPINGO-OOPHORECTOMY       Anesthesia Type:  General    Note:  Disposition: Inpatient   Postop Pain Control: Uneventful            Sign Out: Well controlled pain   PONV: No   Neuro/Psych: Uneventful            Sign Out: Acceptable/Baseline neuro status   Airway/Respiratory: Uneventful            Sign Out: Acceptable/Baseline resp. status   CV/Hemodynamics: Uneventful            Sign Out: Acceptable CV status   Other NRE: NONE   DID A NON-ROUTINE EVENT OCCUR? No           Last vitals:  Vitals Value Taken Time   /63 03/21/22 1340   Temp 36.7  C (98.1  F) 03/21/22 1117   Pulse 70 03/21/22 1348   Resp 13 03/21/22 1348   SpO2 99 % 03/21/22 1348   Vitals shown include unvalidated device data.    Electronically Signed By: Aly Vaz MD  March 21, 2022  2:17 PM

## 2022-03-21 NOTE — PROGRESS NOTES
PTA medications completed by Medication Scribe day of surgery     Medication history sources: Patient, Surescripts and H&P  In the past week, patient estimated taking medication this percent of the time: Greater than 90%  Adherence assessment: N/A Not Observed    Significant changes made to the medication list:  None      Additional medication history information:   None    Medication reconciliation completed by provider prior to medication history? No    Time spent in this activity: 35 minutes    The information provided in this note is only as accurate as the sources available at the time of update(s)    Prior to Admission medications    Medication Sig Last Dose Taking? Auth Provider   Cholecalciferol (VITAMIN D PO) Take 1 tablet by mouth every morning  MORE THAN TWO WEEKS Yes Reported, Patient   ferrous sulfate (FEROSUL) 325 (65 Fe) MG tablet Take 325 mg by mouth daily at noon MORE THAN TWO WEEKS Yes Reported, Patient   levothyroxine (SYNTHROID) 100 MCG tablet Take 100 mcg by mouth every morning **SYNTHROID BRAND NAME ONLY** 3/20/2022 at AM Yes Reported, Patient   liothyronine (CYTOMEL) 5 MCG tablet Take 10 mcg by mouth every morning (5mcg x 2 = 10mcg) 3/20/2022 at AM Yes Reported, Patient   Melatonin 10 MG TABS tablet Take 10 mg by mouth nightly as needed for sleep  at PRN Yes Reported, Patient   naproxen sodium (ANAPROX) 220 MG tablet Take 220-440 mg by mouth 2 times daily as needed for moderate pain MORE THAN TWO WEEKS Yes Reported, Patient     Medication history completed by:    Palmer Patel CPhT  Medication Scribe  [facility]

## 2022-03-21 NOTE — BRIEF OP NOTE
[facility]    Brief Operative Note    Pre-operative diagnosis: Diastasis recti [M62.08]  Umbilical hernia without obstruction and without gangrene [K42.9]  Post-operative diagnosis Same as pre-operative diagnosis    Procedure: Procedure(s):  RECONSTRUCTION, ABDOMINAL WALL  ROBOTIC ASSISTED TOTAL HYSTERECTOMY, BILATERAL SALPINGO-OOPHORECTOMY  Surgeon: Surgeon(s) and Role:  Panel 1:     * John Rosales MD - Primary     * Ingrid Harris PA-C  Panel 2:     * Cory Alonso MD - Primary     * Nighat Gamboa PA-C - Assisting  Anesthesia: General   Estimated Blood Loss: 40 mL from 3/21/2022  7:40 AM to 3/21/2022 11:15 AM      Drains: Clive-De Guzman x 2   Specimens:   ID Type Source Tests Collected by Time Destination   1 : UTERUS, CERVIX, BILATERAL FALLOPIAN TUBES AND OVARIES Tissue Uterus, Cervix, Bilateral Fallopian Tubes & Ovaries SURGICAL PATHOLOGY EXAM Cory Alonso MD 3/21/2022  8:31 AM      Findings:   Small umbilical hernia and small supraumbilical hernia, both closed with 0-vicryl. Diastasis recti closed. Large symbotex placed.  Complications: None.  Implants:   Implant Name Type Inv. Item Serial No.  Lot No. LRB No. Used Action   MESH SYMBOTEX COMPOSITE STEX 30CM X 20CM [id number] - [id number] Mesh MESH SYMBOTEX COMPOSITE STEX 30CM X 20CM [id number]  [place] [employer] [employer] [id number] N/A 1 Implanted

## 2022-03-21 NOTE — ANESTHESIA CARE TRANSFER NOTE
Patient: Nohelia Wong    Procedure: Procedure(s):  RECONSTRUCTION, ABDOMINAL WALL  ROBOTIC ASSISTED TOTAL HYSTERECTOMY, BILATERAL SALPINGO-OOPHORECTOMY       Diagnosis: Diastasis recti [M62.08]  Umbilical hernia without obstruction and without gangrene [K42.9]  Diagnosis Additional Information: No value filed.    Anesthesia Type:   General     Note:    Oropharynx: oropharynx clear of all foreign objects and spontaneously breathing  Level of Consciousness: awake  Oxygen Supplementation: room air    Independent Airway: airway patency satisfactory and stable  Dentition: dentition unchanged  Vital Signs Stable: post-procedure vital signs reviewed and stable  Report to RN Given: handoff report given  Patient transferred to: PACU  Comments: Neuromuscular blockade reversed after TOF 4/4, spontaneous respirations, adequate tidal volumes, followed commands to voice, oropharynx suctioned with soft flexible catheter, extubated atraumatically, extubated with suction, airway patent after extubation.  Oxygen via room air at room air to PACU. Oxygen tubing connected to wall O2 in PACU, SpO2, NiBP, and EKG monitors and alarms on and functioning, Shahla Hugger warmer connected to patient gown, report on patient's clinical status given to PACU RN, RN questions answered.     Handoff Report: Identifed the Patient, Identified the Reponsible Provider, Reviewed the pertinent medical history, Discussed the surgical course, Reviewed Intra-OP anesthesia mangement and issues during anesthesia, Set expectations for post-procedure period and Allowed opportunity for questions and acknowledgement of understanding      Vitals:  Vitals Value Taken Time   /54 03/21/22 1120   Temp     Pulse 84 03/21/22 1121   Resp 14 03/21/22 1121   SpO2 96 % 03/21/22 1121   Vitals shown include unvalidated device data.    Electronically Signed By: Rosalba Alexandra  March 21, 2022  11:22 AM

## 2022-03-21 NOTE — OP NOTE
Preoperative diagnosis: Symptomatic rectus diastases with umbilical hernia  Postoperative diagnosis: Same, umbilical and supraumbilical hernia  Procedure: Abdominal wall reconstruction with mesh, elevation of the skin and subcutaneous flaps   Surgeon: John pablo M.D.   Asst.: Ingrid Harris PA-C, Physician assistant first assistant was necessary during this procedure due to expertise in patient positioning, prepping, incisional opening, retraction, exposure, and suctioning.  Anesthesia: Gen. Endotracheal   Estimate blood loss: 10 cc   Drains: Two 15 round TERRIE's in the subcutaneous space   Complications/specimens: None   Indication for procedure: This is a 50 yo female who presented to my office with a symptomatic rectus diastases with umbilical hernia. Her surgical options were thoroughly reviewed. It was elected to proceed with open repair. The potential risks of bleeding, recurrence, infection, prosthetic infection, or chronic pain were all reviewed in detail. The patient wished to proceed.   Procedure: The patient was taken the operating room and placed supine on the operating table. She was then placed under adequate general endotracheal anesthesia.  Patient was placed in yellowfin stirrups and ultimately underwent robotic assisted laparoscopic total hysterectomy with bilateral salpingo-oophorectomy.  Once this was complete patient was taken out of stirrups and repositioned into a standard supine position.  The abdomen was then reprepped and draped.  Surgeon initiated Timeout was completed. Appropriate IV antibiotics had been administered.  A transverse lower abdominal incision was made from the ASIS to ASIS in a curvilinear fashion at the level just above the mons pubis. This incision was carried down through the subcutaneous tissues. Upon encountering the abdominal wall fascia, I elevated skin and subcutaneous skin flaps away from this. This was carried up to the level of the xiphoid, to the costal  margins, laterally to the anterior axilla line, and inferiorly down to the level of the pubis. In the process of this dissection I mobilized the umbilical skin from the underlying hernia sac.  The diameter of the hernia at the umbilicus measured approximately 1 cm in diameter.  Further elevation along the midline superiorly also revealed a fat-containing epigastric as well as a very small fat-containing supraumbilical hernia.  All of these various hernias were dissected down to the level of the fascial margins and ultimately reduced.   Once this was completed I measured the upper midline separation and this was approximately 4.5 cm at its greatest. I then incised along the medial border of the rectus abdominis on both sides. This was carried up to the level of the xiphoid process and down to the level of the symphysis pubis. I then dissected in the retrorectus position.  The posterior fascia was then plicated along the midline closing off the epigastric, supraumbilical and umbilical hernia defects.  I then placed a large oval shaped Symbotex mesh that was 30cm in cranial caudad length and trimmed to a large oval to accommodate the retro-rectus space. This went down to the level of the symphysis pubis and up to the xiphoid process. With the mesh in good position the anterior fascia was closed using running 0 Maxon suture. Hemostasis in the subcutaneous space was adequate.  One of the robotic assisted laparoscopic incisions was within the umbilicus.  The umbilical skin was sutured closed with interrupted sutures of 4-0 chromic.  The umbilical skin was tacked back to the fascial closure using a 3-0 Vicryl suture. Two15 round Clive-De Guzman drains were placed in the subcutaneous space through  separate stab incisions below the inguinal crease. I then excised the redundant lower abdominal skin in the watershed vascular area at the inferior most aspect of my skin flap. The deep subcutaneous and deep dermal layers were then  reapproximated with 2-0 Vicryl and 3-0 Vicryl sutures.  The incision was closed with a running subcuticular 4-0 Monocryl stitch. Steri-Strips were applied. The patient tolerated this well. Needle and sponge counts were correct. She was awakened in the operating room, extubated, and taken to the recovery room in stable condition.    John Rosales MD

## 2022-03-21 NOTE — ANESTHESIA PROCEDURE NOTES
Airway       Patient location during procedure: OR       Procedure Start/Stop Times: 3/21/2022 7:48 AM  Staff -        Anesthesiologist:  Aly Vaz MD       CRNA: Rosalba Alexandra       Performed By: CRNA  Consent for Airway        Urgency: elective  Indications and Patient Condition       Indications for airway management: wilver-procedural       Induction type:intravenous       Mask difficulty assessment: 2 - vent by mask + OA or adjuvant +/- NMBA    Final Airway Details       Final airway type: endotracheal airway       Successful airway: ETT - single and Oral  Endotracheal Airway Details        ETT size (mm): 7.0       Cuffed: yes       Successful intubation technique: direct laryngoscopy       DL Blade Type: MAC 3       Grade View of Cords: 1       Adjucts: stylet       Position: Right       Measured from: gums/teeth       Secured at (cm): 22       Bite block used: None    Post intubation assessment        Placement verified by: capnometry, equal breath sounds and chest rise        Number of attempts at approach: 1       Number of other approaches attempted: 0       Secured with: pink tape       Ease of procedure: easy       Dentition: Intact and Unchanged

## 2022-03-22 LAB — HGB BLD-MCNC: 8.1 G/DL (ref 11.7–15.7)

## 2022-03-22 PROCEDURE — 120N000001 HC R&B MED SURG/OB

## 2022-03-22 PROCEDURE — 85018 HEMOGLOBIN: CPT | Performed by: PHYSICIAN ASSISTANT

## 2022-03-22 PROCEDURE — 258N000003 HC RX IP 258 OP 636: Performed by: PHYSICIAN ASSISTANT

## 2022-03-22 PROCEDURE — 250N000013 HC RX MED GY IP 250 OP 250 PS 637: Performed by: PHYSICIAN ASSISTANT

## 2022-03-22 PROCEDURE — 96372 THER/PROPH/DIAG INJ SC/IM: CPT | Performed by: PHYSICIAN ASSISTANT

## 2022-03-22 PROCEDURE — 36415 COLL VENOUS BLD VENIPUNCTURE: CPT | Performed by: PHYSICIAN ASSISTANT

## 2022-03-22 PROCEDURE — 250N000011 HC RX IP 250 OP 636: Performed by: PHYSICIAN ASSISTANT

## 2022-03-22 RX ORDER — CYCLOBENZAPRINE HCL 10 MG
10 TABLET ORAL 3 TIMES DAILY PRN
Qty: 15 TABLET | Refills: 0 | Status: SHIPPED | OUTPATIENT
Start: 2022-03-22 | End: 2022-03-25

## 2022-03-22 RX ORDER — AMOXICILLIN 250 MG
1 CAPSULE ORAL 2 TIMES DAILY
Qty: 15 TABLET | Refills: 0 | Status: SHIPPED | OUTPATIENT
Start: 2022-03-22 | End: 2022-09-22

## 2022-03-22 RX ORDER — OXYCODONE HYDROCHLORIDE 5 MG/1
5-10 TABLET ORAL EVERY 4 HOURS PRN
Qty: 15 TABLET | Refills: 0 | Status: SHIPPED | OUTPATIENT
Start: 2022-03-22 | End: 2022-03-25

## 2022-03-22 RX ORDER — ACETAMINOPHEN 325 MG/1
650 TABLET ORAL EVERY 4 HOURS PRN
COMMUNITY
Start: 2022-03-24 | End: 2022-09-22

## 2022-03-22 RX ADMIN — CYCLOBENZAPRINE 10 MG: 10 TABLET, FILM COATED ORAL at 14:33

## 2022-03-22 RX ADMIN — OXYCODONE HYDROCHLORIDE 10 MG: 5 TABLET ORAL at 12:33

## 2022-03-22 RX ADMIN — OXYCODONE HYDROCHLORIDE 10 MG: 5 TABLET ORAL at 16:31

## 2022-03-22 RX ADMIN — LEVOTHYROXINE SODIUM 100 MCG: 100 TABLET ORAL at 10:07

## 2022-03-22 RX ADMIN — HYDROMORPHONE HYDROCHLORIDE 0.2 MG: 0.2 INJECTION, SOLUTION INTRAMUSCULAR; INTRAVENOUS; SUBCUTANEOUS at 04:44

## 2022-03-22 RX ADMIN — SENNOSIDES AND DOCUSATE SODIUM 1 TABLET: 50; 8.6 TABLET ORAL at 21:34

## 2022-03-22 RX ADMIN — HYDROMORPHONE HYDROCHLORIDE 0.2 MG: 0.2 INJECTION, SOLUTION INTRAMUSCULAR; INTRAVENOUS; SUBCUTANEOUS at 02:10

## 2022-03-22 RX ADMIN — FAMOTIDINE 20 MG: 20 TABLET ORAL at 21:34

## 2022-03-22 RX ADMIN — ENOXAPARIN SODIUM 40 MG: 40 INJECTION SUBCUTANEOUS at 10:07

## 2022-03-22 RX ADMIN — FAMOTIDINE 20 MG: 20 TABLET ORAL at 07:40

## 2022-03-22 RX ADMIN — ACETAMINOPHEN 975 MG: 325 TABLET, FILM COATED ORAL at 23:57

## 2022-03-22 RX ADMIN — CYCLOBENZAPRINE 10 MG: 10 TABLET, FILM COATED ORAL at 19:11

## 2022-03-22 RX ADMIN — CYCLOBENZAPRINE 10 MG: 10 TABLET, FILM COATED ORAL at 07:40

## 2022-03-22 RX ADMIN — OXYCODONE HYDROCHLORIDE 10 MG: 5 TABLET ORAL at 07:40

## 2022-03-22 RX ADMIN — ACETAMINOPHEN 975 MG: 325 TABLET, FILM COATED ORAL at 16:32

## 2022-03-22 RX ADMIN — SODIUM CHLORIDE, POTASSIUM CHLORIDE, SODIUM LACTATE AND CALCIUM CHLORIDE: 600; 310; 30; 20 INJECTION, SOLUTION INTRAVENOUS at 00:51

## 2022-03-22 RX ADMIN — ACETAMINOPHEN 975 MG: 325 TABLET, FILM COATED ORAL at 07:40

## 2022-03-22 ASSESSMENT — ACTIVITIES OF DAILY LIVING (ADL)
ADLS_ACUITY_SCORE: 6

## 2022-03-22 NOTE — PROGRESS NOTES
[facility]    General Surgery  Short Progress Note    Nohelia Wong is seen this afternoon. She is still struggling with getting out of bed due to pain with this. Better pain control this afternoon than this morning. Tolerating diet although some bloating with this. Mild acute blood loss anemia, will recheck in the morning. BP soft at times, no tachycardia, and no lightheadedness. Nurse in room for drain teaching right now.    Focus on pain control this evening and will recheck Hgb in the morning. Anticipate discharge tomorrow. Patient and  agreeable to this plan.    Ingrid Harris PA-C

## 2022-03-22 NOTE — PROGRESS NOTES
"General Surgery Progress Note    Admission Date: 3/21/2022  Today's Date: 3/22/2022         Assessment:      Nohelia Wong is a 51 year old female POD 1 s/p abdominal wall reconstruction with mesh, elevation of the skin and subcutaneous flaps (Bobby), robotic-assisted total laparoscopic hysterectomy, bilateral salpingo-oophorectomy (English).    Doing well, greenberg removed and voiding well. Pain controlled. Tolerating regular diet.         Plan:   - Continue scheduled tylenol and flexeril, oxy prn  - Bowel regimen with miralax daily  - Work on more out of bed, ambulation  - Continue drains, strip every shift, drain teaching. Keep abdominal binder in place  - Greenberg out, voiding well  - Regular diet  - Saline lock IV fluids once PO intake adequate  - PCDs, lovenox for DVT ppx  - GYN cares per Dr. Alonso    Dispo: doing well, could go home later today vs tomorrow pending continued progress. Surgical provider will recheck patient this afternoon        Interval History:   Afebrile, vitals stable now on room air. Feeling quite sore but meds are helpful. No nausea currently, tolerating diet. Went for walk last night and this morning. Feels steady on her feet, a bit \"whoozy\" yesterday but good today. Scop patch removed, having some dry mouth. Has  at home and a friend coming on Wednesday to help her, currently unable to get out of bed on her own due to pain. 100cc total out from TERRIE drains yesterday.          Physical Exam:   /44 (BP Location: Right arm)   Pulse 60   Temp 97  F (36.1  C) (Oral)   Resp 18   Ht 1.676 m (5' 6\")   Wt 69.4 kg (153 lb)   SpO2 98%   BMI 24.69 kg/m    I/O last 3 completed shifts:  In: 1700 [P.O.:200; I.V.:1500]  Out: 1340 [Urine:1200; Drains:100; Blood:40]  General: NAD, pleasant, alert and oriented x3  Cardiovascular: regular rate and rhythm; S1 and S2 distinct without murmur   Respiratory: lungs clear to auscultation bilaterally without wheezes, rales or rhonchi   Abdomen: " soft, appropriately tender around incisions, non distended. Bilateral TERRIE drains with serosanguinous fluid in bulbs  Incisions: clean, dry, and intact with dressings in place  Extremities: no lower extremity edema, no calf tenderness. Wearing PCDs    LABS:  Recent Labs   Lab Test 03/22/22  0658 03/21/22  1520 03/21/22  0553   HGB 8.1* 9.0* 9.2*   PLT  --  178  --          -------------------------------    Cici Sams PA-C  [facility]  [phone]       to pathology/intact/delivered spontaneously

## 2022-03-22 NOTE — PLAN OF CARE
Goal Outcome Evaluation:      Summary:   3-22-22, [id number]    Pt is Aox4, anxious. VSS on RA. Pain managed with scheduled meds and dilaudid x2. SBA. Reg diet. No nausea or SOB. CMS intact. abdomen soft, tender, BS +. Dressing CDI, JPs wnl, wearing abdominal binder. Caicedo discontinued voiding in BR. Progressing per plan of care.

## 2022-03-22 NOTE — PLAN OF CARE
Pt is AOx4, abdomen soft, tender, BS +, pain is managed with current regimen, dressing CDI, JPs wnl, Caicedo discontinued at 1030pm, DTV, tolerating regular diet, ambulated in kramer with SBA, progressing per plan of care.

## 2022-03-22 NOTE — PLAN OF CARE
Goal Outcome Evaluation:                  Up ind in room and halls. Tolerating small PO intake. PRN/scheduled meds.   TERRIE x2, teaching done with pt and spouse. Binder. Incision dressings CDI.

## 2022-03-23 VITALS
HEART RATE: 81 BPM | TEMPERATURE: 98.3 F | BODY MASS INDEX: 24.59 KG/M2 | RESPIRATION RATE: 16 BRPM | OXYGEN SATURATION: 96 % | DIASTOLIC BLOOD PRESSURE: 54 MMHG | SYSTOLIC BLOOD PRESSURE: 110 MMHG | HEIGHT: 66 IN | WEIGHT: 153 LBS

## 2022-03-23 LAB
GLUCOSE BLDC GLUCOMTR-MCNC: 81 MG/DL (ref 70–99)
HGB BLD-MCNC: 8.3 G/DL (ref 11.7–15.7)

## 2022-03-23 PROCEDURE — 250N000011 HC RX IP 250 OP 636: Performed by: PHYSICIAN ASSISTANT

## 2022-03-23 PROCEDURE — 250N000013 HC RX MED GY IP 250 OP 250 PS 637: Performed by: PHYSICIAN ASSISTANT

## 2022-03-23 PROCEDURE — 85018 HEMOGLOBIN: CPT | Performed by: PHYSICIAN ASSISTANT

## 2022-03-23 PROCEDURE — 36415 COLL VENOUS BLD VENIPUNCTURE: CPT | Performed by: PHYSICIAN ASSISTANT

## 2022-03-23 RX ADMIN — CYCLOBENZAPRINE 10 MG: 10 TABLET, FILM COATED ORAL at 09:51

## 2022-03-23 RX ADMIN — LEVOTHYROXINE SODIUM 100 MCG: 100 TABLET ORAL at 06:36

## 2022-03-23 RX ADMIN — OXYCODONE HYDROCHLORIDE 10 MG: 5 TABLET ORAL at 03:13

## 2022-03-23 RX ADMIN — ACETAMINOPHEN 975 MG: 325 TABLET, FILM COATED ORAL at 09:50

## 2022-03-23 RX ADMIN — FAMOTIDINE 20 MG: 20 TABLET ORAL at 09:50

## 2022-03-23 RX ADMIN — POLYETHYLENE GLYCOL 3350 17 G: 17 POWDER, FOR SOLUTION ORAL at 09:49

## 2022-03-23 RX ADMIN — ENOXAPARIN SODIUM 40 MG: 40 INJECTION SUBCUTANEOUS at 09:49

## 2022-03-23 RX ADMIN — OXYCODONE HYDROCHLORIDE 10 MG: 5 TABLET ORAL at 11:13

## 2022-03-23 ASSESSMENT — ACTIVITIES OF DAILY LIVING (ADL)
ADLS_ACUITY_SCORE: 6

## 2022-03-23 NOTE — DISCHARGE SUMMARY
[facility] Discharge Summary    Nohelia Wong MRN# [id number]   Age: 51 year old YOB: 1970     Date of Admission:  3/21/2022  Date of Discharge:  3/23/2022  Admitting Provider:  John Rosales MD  Discharge Provider:  Cici Sams PA-C  Discharging Service: General Surgery     Primary Provider: Collette Valdez  Primary Care Physician Phone Number: [phone]          Admission Diagnoses:   Principle Diagnosis: Diastasis recti [M62.08]  Umbilical hernia without obstruction and without gangrene [K42.9]  Umbilical hernia [K42.9]  Secondary Diagnoses: Fibroid uterus          Discharge Diagnosis:     Diastasis recti [M62.08]  Umbilical hernia without obstruction and without gangrene [K42.9]          Procedures:     Procedure(s):   abdominal wall reconstruction with mesh, elevation of the skin and subcutaneous flaps by Dr. Rosales    robotic assisted total hysterectomy and bilateral salpingo-oophorectomy by Dr. Alonso            Discharge Medications:     Current Discharge Medication List      START taking these medications    Details   acetaminophen (TYLENOL) 325 MG tablet Take 2 tablets (650 mg) by mouth every 4 hours as needed for pain    Associated Diagnoses: Umbilical hernia without obstruction and without gangrene      cyclobenzaprine (FLEXERIL) 10 MG tablet Take 1 tablet (10 mg) by mouth 3 times daily as needed for muscle spasms (pain)  Qty: 15 tablet, Refills: 0    Associated Diagnoses: Umbilical hernia without obstruction and without gangrene      oxyCODONE (ROXICODONE) 5 MG tablet Take 1-2 tablets (5-10 mg) by mouth every 4 hours as needed for moderate to severe pain  Qty: 15 tablet, Refills: 0    Associated Diagnoses: Umbilical hernia without obstruction and without gangrene      senna-docusate (SENOKOT-S/PERICOLACE) 8.6-50 MG tablet Take 1 tablet by mouth 2 times daily  Qty: 15 tablet, Refills: 0    Associated Diagnoses: Umbilical hernia without obstruction and without  gangrene         CONTINUE these medications which have NOT CHANGED    Details   Cholecalciferol (VITAMIN D PO) Take 1 tablet by mouth every morning       ferrous sulfate (FEROSUL) 325 (65 Fe) MG tablet Take 325 mg by mouth daily at noon      levothyroxine (SYNTHROID) 100 MCG tablet Take 100 mcg by mouth every morning **SYNTHROID BRAND NAME ONLY**      liothyronine (CYTOMEL) 5 MCG tablet Take 10 mcg by mouth every morning (5mcg x 2 = 10mcg)      Melatonin 10 MG TABS tablet Take 10 mg by mouth nightly as needed for sleep      naproxen sodium (ANAPROX) 220 MG tablet Take 220-440 mg by mouth 2 times daily as needed for moderate pain                 Allergies:         Allergies   Allergen Reactions     Morphine Itching             Brief History of Illness:    Reason for your hospital stay      Abdominal wall reconstruction and hysterectomy             Nohelia Wong is a 51-year-old female who was seen by Dr. Rosales in consultation on 21 regarding umbilical hernia and diastases recti.  She has had 3 pregnancies all delivered via .  She feels that she developed the umbilical hernia with her last pregnancy which was approximately 8 years ago.  She feels that the hernia has gotten slowly larger over time.  In addition to this she has significant core insufficiency.  She has difficulty performing sit ups.  She has chronic low back pain.  She has discomfort and pain along the rectus abdominis bilaterally.  She has lack of confidence in her abdominal/core muscles.  She does not have any GI symptoms.  No signs or symptoms that would suggest incarceration or strangulation.  She was seen by Dr. Alonso in OB/gyn for symptomatic fibroid uterus who recommended Da Ihsan total laparoscopic hysterectomy, bilateral salpingo-oophorectomy.  Due to upcoming Gyn surgery and symptomatic hernia and diastases recti, Dr. Rosales offered abdominal wall reconstruction.     After discussing the risks, benefits, and possible  "complications, informed consent was obtained and the patient underwent abdominal wall reconstruction with mesh, elevation of the skin and subcutaneous flaps by Dr. Rosales and robotic assisted total hysterectomy and bilateral salpingo-oophorectomy by Dr. Alonso.  There were no complications.  Please see the Operative Report for full details.           Hospital Course:   Nohelia Wong's hospital course was unremarkable.  She had mild acute blood loss anemia with dilutional component that stabilized.  Her TERRIE drain output was appropriate and vitals were wnl.  She voided well and was able to ambulate.  She tolerated diet advancement and pain was controlled with oral analgesia by POD2.  She had nursing drain education prior to discharge.    On the date of discharge, the patient was discharged to home in stable condition and afebrile.  She verbalized understanding of all discharge instructions and felt comfortable with the discharge plan.  She was asked to call with any further questions or concerns.         Condition on Discharge:        Discharge condition: Stable   Discharge vitals: Blood pressure 110/54, pulse 81, temperature 98.3  F (36.8  C), temperature source Oral, resp. rate 16, height 1.676 m (5' 6\"), weight 69.4 kg (153 lb), SpO2 96 %, unknown if currently breastfeeding.           Discharge Disposition:     Discharged to home          Discharge Instructions and Follow-Up:      Nohelia Wong was asked to follow up with Dr. Rosales next week for possible drain removal.      Ingrid Harris PA-C  [facility]  [phone]     "

## 2022-03-23 NOTE — PROGRESS NOTES
"General Surgery Progress Note    Admission Date: 3/21/2022  Today's Date: 3/23/2022         Assessment:      Nohelia Wong is a 51 year old female POD 2 s/p abdominal wall reconstruction with mesh, elevation of the skin and subcutaneous flaps (Bobby), robotic-assisted total laparoscopic hysterectomy, bilateral salpingo-oophorectomy (English).     Doing well, tolerating diet and voiding well. Pain controlled. Hemoglobin stable.         Plan:   - Discharge home today. Instructions reviewed, questions answered. Follow-up in clinic next week with Dr. Rosales for recheck, possible drain removal  - Continue scheduled tylenol and flexeril, oxy prn  - Bowel regimen with senna-docusate daily, also has miralax at home  - Doing well with out of bed, ambulation.  and friend at home to help  - Continue drains, strip every shift, drain teaching. Keep abdominal binder in place  - Left drain site will continue to leak due to incision size. Offered bedside procedure to place suture at this location to tighten incision, she is fine with drainage and will use dressings as needed.   - Regular diet  - PCDs, lovenox for DVT ppx while here  - GYN cares per Dr. Alonso        Interval History:   Afebrile, vitals stable on room air. Feeling better today, ready to go home. Moving well, voiding, tolerating diet without nausea. Feels a bit more swollen/bloated today. Passing gas, no BM yet. Hemoglobin stable at 8.3 (9.0 > 8.1 after surgery). Reports chronic anemia, heavy periods. Has been thoroughly worked up, hopeful that hysterectomy will improve this over time. Left drain site leaking.  at bedside.           Physical Exam:   /54 (BP Location: Right arm)   Pulse 81   Temp 98.3  F (36.8  C) (Oral)   Resp 16   Ht 1.676 m (5' 6\")   Wt 69.4 kg (153 lb)   SpO2 96%   BMI 24.69 kg/m    I/O last 3 completed shifts:  In: 1200 [P.O.:1200]  Out: 70 [Drains:70]  General: NAD, pleasant, alert and oriented x3  Respiratory: " non-labored breathing   Abdomen: slightly distended but soft, appropriately tender. Extended pfannenstiel incision and lap sites are clean and intact with steris. Outer dressings removed. Left drain site with incision slightly larger than drain resulting in drainage around drain tubing. Drain sites redressed with gauze/tape. Serosanguinous fluid in drains.  Extremities: no lower extremity edema, no calf tenderness. Wearing PCDs    LABS:  Recent Labs   Lab Test 03/23/22  0641 03/22/22  0658 03/21/22  1520   HGB 8.3* 8.1* 9.0*   PLT  --   --  178           -------------------------------    Cici Sams PA-C  [facility]  [phone]

## 2022-03-23 NOTE — UTILIZATION REVIEW
"  Admission Status; Secondary Review Determination         Under the authority of the Utilization Management Committee, the utilization review process indicated a secondary review on the above patient.  The review outcome is based on review of the medical records, discussions with staff, and applying clinical experience noted on the date of the review.        ()      Inpatient Status Appropriate - This patient's medical care is consistent with medical management for inpatient care and reasonable inpatient medical practice.      () Observation Status Appropriate - This patient does not meet hospital inpatient criteria and is placed in observation status. If this patient's primary payer is Medicare and was admitted as an inpatient, Condition Code 44 should be used and patient status changed to \"observation\".   () Admission Status NOT Appropriate - This patient's medical care is not consistent with medical management for Inpatient or Observation Status.          RATIONALE FOR DETERMINATION   The patient is a 51-year-old female who was seen by Dr. John Rosales in consultation on 9/23/2021 regarding umbilical hernia associated with diastases rectus.  Patient was admitted on 3/21/2022 for surgical procedure to repair umbilical hernia and diastases rectus.  The patient underwent abdominal wall reconstruction with mesh and elevation of the skin and subcutaneous flaps requiring placement of drains.  Surgical procedure appears to have been fairly complex and postop care has been uncomplicated and the patient is being discharged on postop day #2.  Based on extensive nature of this surgical procedure and need for 2 days admission, inpatient status is recommended to be continued.    The severity of illness, intensity of service provided, expected LOS and risk for adverse outcome make the care complex, high risk and appropriate for hospital admission.        The information on this document is developed by the utilization " review team in order for the business office to ensure compliance.  This only denotes the appropriateness of proper admission status and does not reflect the quality of care rendered.         The definitions of Inpatient Status and Observation Status used in making the determination above are those provided in the CMS Coverage Manual, Chapter 1 and Chapter 6, section 70.4.      Sincerely,     Richie Hernandez MD  Physician Advisor  Utilization Review/ Case Management  [facility].

## 2022-03-23 NOTE — PLAN OF CARE
Patient AOX4. Ind in room. Abdominal pain was managed with PRN oxycodone and scheduled tylenol and flexeril. Passing gas. TERRIE sites CDI, changed by provider. Lap sites CDI. Abdominal binder in place.     Patient and  received education on drain management. Patient was discharged home this shift with . All questions and concerns related to discharge were addressed. All belongings and Rx sent home with patient.

## 2022-03-23 NOTE — PLAN OF CARE
Goal Outcome Evaluation:    Shift Update: 1900-0730    Pt A&Ox4. Complaints of anywhere from 2-6/10 pain controlled with scheduled tylenol, flexeril and PRN oxy. Pt struggled with restless legs overnight so not much sleep but did get up and walk in the halls. Pt having drainage from TERRIE and incision sites. Abdominal binder intact. Voiding well. Ind in the room but SBA in the kramer for reassurance. VSS on RA. TERRIE teaching done yesterday afternoon with patient and - supplies in the room and ready. Discharge today home with . Will continue plan of care.

## 2022-03-24 LAB
PATH REPORT.COMMENTS IMP SPEC: NORMAL
PATH REPORT.COMMENTS IMP SPEC: NORMAL
PATH REPORT.FINAL DX SPEC: NORMAL
PATH REPORT.GROSS SPEC: NORMAL
PATH REPORT.MICROSCOPIC SPEC OTHER STN: NORMAL
PATH REPORT.RELEVANT HX SPEC: NORMAL
PHOTO IMAGE: NORMAL

## 2022-03-25 ENCOUNTER — TELEPHONE (OUTPATIENT)
Dept: SURGERY | Facility: CLINIC | Age: 52
End: 2022-03-25
Payer: COMMERCIAL

## 2022-03-25 DIAGNOSIS — K42.9 UMBILICAL HERNIA WITHOUT OBSTRUCTION AND WITHOUT GANGRENE: ICD-10-CM

## 2022-03-25 RX ORDER — CYCLOBENZAPRINE HCL 10 MG
10 TABLET ORAL 3 TIMES DAILY PRN
Qty: 15 TABLET | Refills: 0 | Status: SHIPPED | OUTPATIENT
Start: 2022-03-25 | End: 2022-09-22

## 2022-03-25 RX ORDER — OXYCODONE HYDROCHLORIDE 5 MG/1
5 TABLET ORAL EVERY 4 HOURS PRN
Qty: 15 TABLET | Refills: 0 | Status: SHIPPED | OUTPATIENT
Start: 2022-03-25 | End: 2022-03-28

## 2022-03-25 NOTE — TELEPHONE ENCOUNTER
Name of caller: Patient    Reason for Call:  Medication request:    1.  oxyCODONE (ROXICODONE) 5 MG tablet  Pt. Is a lot of pain still and only has 1 pill left.    2.  cyclobenzaprine (FLEXERIL) 10 MG tablet    Surgeon:  Dr. Rosales     Recent Surgery:  Yes.    If yes, when & what type:  3/21/22    RECONSTRUCTION, ABDOMINAL WALL    Best phone number to reach pt at is: [phone]    Ok to leave a message with medical info? Yes.    Pharmacy preferred (if calling for a refill): [facility]Y #[id number] - [place], [place] - [address].

## 2022-03-25 NOTE — TELEPHONE ENCOUNTER
Attempted to call patient back. Left message encouraging her to call to discuss    Call back number provided    Mile Larry RN-BSN

## 2022-03-25 NOTE — TELEPHONE ENCOUNTER
Called patient's  to inform him rx has been sent    Encouraged continued use of tylenol and ibuprofen in rotation with the oxycodone for pain management    He is in agreement and they will call PRN    Mile Larry RN-BSN

## 2022-03-28 ENCOUNTER — OFFICE VISIT (OUTPATIENT)
Dept: SURGERY | Facility: CLINIC | Age: 52
End: 2022-03-28
Payer: COMMERCIAL

## 2022-03-28 DIAGNOSIS — Z09 SURGERY FOLLOW-UP EXAMINATION: Primary | ICD-10-CM

## 2022-03-28 DIAGNOSIS — K42.9 UMBILICAL HERNIA WITHOUT OBSTRUCTION AND WITHOUT GANGRENE: ICD-10-CM

## 2022-03-28 PROCEDURE — 99024 POSTOP FOLLOW-UP VISIT: CPT | Performed by: SURGERY

## 2022-03-28 RX ORDER — OXYCODONE HYDROCHLORIDE 5 MG/1
5 TABLET ORAL EVERY 4 HOURS PRN
Qty: 15 TABLET | Refills: 0 | Status: SHIPPED | OUTPATIENT
Start: 2022-03-28 | End: 2022-09-22

## 2022-03-28 NOTE — LETTER
March 31, 2022          Collette Valdez MD  [facility] [place]  [address]  [id number]  [place] [address]      RE:   Nohelia Wong 1970      Dear Colleague,    Thank you for referring your patient, Nohelia Wong, to [facility] at [facility]. Please see a copy of my visit note below.    Patient returns in follow-up after recent abdominal wall reconstruction for symptomatic diastases and umbilical hernia.  She also at the time of surgery underwent robotic assisted laparoscopic hysterectomy with bilateral salpingo-oophorectomy.  Overall she seems to be doing appropriately.  She is still occasionally using narcotics for pain control.  She is increasing her mobility.  She denies fevers or chills.  She reports minimal TERRIE output.  Bowel function is at baseline.     On examination: Her incision is clean dry and intact.  Drain sites are good.  There is no abdominal wall hematoma or seroma.  There is no evidence of infection.  JPs were removed without complication.     We discussed her ongoing activity restrictions.  She was given instructions on drain site care.  Her questions were answered and she will follow up again in approximately 2 weeks.    Again, thank you for allowing me to participate in the care of your patient.      Sincerely,      John Rosales MD

## 2022-03-31 NOTE — PROGRESS NOTES
Patient returns in follow-up after recent abdominal wall reconstruction for symptomatic diastases and umbilical hernia.  She also at the time of surgery underwent robotic assisted laparoscopic hysterectomy with bilateral salpingo-oophorectomy.  Overall she seems to be doing appropriately.  She is still occasionally using narcotics for pain control.  She is increasing her mobility.  She denies fevers or chills.  She reports minimal TERRIE output.  Bowel function is at baseline.    On examination: Her incision is clean dry and intact.  Drain sites are good.  There is no abdominal wall hematoma or seroma.  There is no evidence of infection.  JPs were removed without complication.    We discussed her ongoing activity restrictions.  She was given instructions on drain site care.  Her questions were answered and she will follow up again in approximately 2 weeks.

## 2022-04-14 ENCOUNTER — OFFICE VISIT (OUTPATIENT)
Dept: SURGERY | Facility: CLINIC | Age: 52
End: 2022-04-14
Payer: COMMERCIAL

## 2022-04-14 DIAGNOSIS — Z09 SURGERY FOLLOW-UP EXAMINATION: Primary | ICD-10-CM

## 2022-04-14 PROCEDURE — 99024 POSTOP FOLLOW-UP VISIT: CPT | Performed by: SURGERY

## 2022-04-14 NOTE — PROGRESS NOTES
Patient returns in ongoing follow-up after recent abdominal wall reconstruction.  Overall she is doing well.  She reports ongoing numbness in the abdominal wall.  She is not having any real significant discomfort or pain.  She is having some soreness with certain motions and activities.  She has been slowly increasing her activities.  Still having moderate discomfort with coughing or sneezing.    On examination: Her incision is clean dry and intact.  There is no significant hematoma or seroma.  There is a healing ridge along the central portion of the abdomen.  There is good approximation of the abdominal wall muscles without evidence of recurrence of diastases or hernia.    Overall progressing very nicely.  We discussed ongoing activity restrictions as well as some things that she can start doing at this time.  Her questions were answered.  I would like to see her back in approximately 1 month for hopefully final recheck.

## 2022-04-14 NOTE — LETTER
April 14, 2022          Collette Valdez MD  [facility] [place]  [address]  [id number]  [place] [address]      RE:   Nohelia Wong 1970      Dear Colleague,    Thank you for referring your patient, Nohelia Wong, to [facility] Please see a copy of my visit note below.    Patient returns in ongoing follow-up after recent abdominal wall reconstruction.  Overall she is doing well.  She reports ongoing numbness in the abdominal wall.  She is not having any real significant discomfort or pain.  She is having some soreness with certain motions and activities.  She has been slowly increasing her activities.  Still having moderate discomfort with coughing or sneezing.     On examination: Her incision is clean dry and intact.  There is no significant hematoma or seroma.  There is a healing ridge along the central portion of the abdomen.  There is good approximation of the abdominal wall muscles without evidence of recurrence of diastases or hernia.     Overall progressing very nicely.  We discussed ongoing activity restrictions as well as some things that she can start doing at this time.  Her questions were answered.  I would like to see her back in approximately 1 month for hopefully final recheck.    Again, thank you for allowing me to participate in the care of your patient.      Sincerely,      John Rosales MD

## 2022-05-01 ENCOUNTER — HEALTH MAINTENANCE LETTER (OUTPATIENT)
Age: 52
End: 2022-05-01

## 2022-05-19 ENCOUNTER — OFFICE VISIT (OUTPATIENT)
Dept: SURGERY | Facility: CLINIC | Age: 52
End: 2022-05-19
Payer: COMMERCIAL

## 2022-05-19 DIAGNOSIS — Z09 SURGERY FOLLOW-UP EXAMINATION: Primary | ICD-10-CM

## 2022-05-19 PROCEDURE — 99024 POSTOP FOLLOW-UP VISIT: CPT | Performed by: SURGERY

## 2022-05-26 NOTE — PROGRESS NOTES
Patient returns in ongoing long-term follow-up after abdominal wall reconstruction.  Overall seems to be doing well.  Still having some issues with confidence in her abdominal wall and advancement of activity.  Some occasional mild discomfort particularly in the right groin.  Bowel function has been normal.    On examination: Incisions well-healed.  No evidence of infection.  No evidence of hernia.    Overall progressing nicely.  She is still relatively early postop and needs to work aggressively on core strengthening and flexibility for rehabilitation.  We discussed activities and exercises.  Her questions were answered.  She will follow up again in approximately 6 to 8 weeks for final checkup.

## 2022-06-16 ENCOUNTER — OFFICE VISIT (OUTPATIENT)
Dept: SURGERY | Facility: CLINIC | Age: 52
End: 2022-06-16
Payer: COMMERCIAL

## 2022-06-16 DIAGNOSIS — Z09 SURGERY FOLLOW-UP EXAMINATION: Primary | ICD-10-CM

## 2022-06-16 DIAGNOSIS — R10.9 ABDOMINAL WALL PAIN: ICD-10-CM

## 2022-06-16 PROCEDURE — 99024 POSTOP FOLLOW-UP VISIT: CPT | Performed by: SURGERY

## 2022-06-20 NOTE — PROGRESS NOTES
Patient returns in ongoing long-term follow-up after abdominal wall reconstruction in March of this year.  Overall she is doing well.  She has been slowly increasing activity.  Still has not ultimately resumed full activities.  She reports some mild abdominal wall pain, no fevers or chills.    On examination: Incision is well-healed.  There is no evidence of infection.  There is good approximation of the abdominal wall muscles.  No evidence of hernia recurrence.  No evidence of recurrent diastases.    Overall she is progressing.  I think she is a little bit behind in terms of her overall level of activity.  We discussed physical therapy I think she would benefit from this.  Should go once a week for 6 weeks.  Would follow-up with her again at the 6-month anniversary of surgery

## 2022-06-23 ENCOUNTER — THERAPY VISIT (OUTPATIENT)
Dept: PHYSICAL THERAPY | Facility: CLINIC | Age: 52
End: 2022-06-23
Attending: SURGERY
Payer: COMMERCIAL

## 2022-06-23 DIAGNOSIS — R10.84 ABDOMINAL PAIN, GENERALIZED: ICD-10-CM

## 2022-06-23 DIAGNOSIS — R19.8 ABDOMINAL TIGHTNESS: ICD-10-CM

## 2022-06-23 DIAGNOSIS — R10.9 ABDOMINAL WALL PAIN: ICD-10-CM

## 2022-06-23 PROCEDURE — 97161 PT EVAL LOW COMPLEX 20 MIN: CPT | Mod: GP | Performed by: PHYSICAL THERAPIST

## 2022-06-23 PROCEDURE — 97110 THERAPEUTIC EXERCISES: CPT | Mod: GP | Performed by: PHYSICAL THERAPIST

## 2022-06-23 PROCEDURE — 97112 NEUROMUSCULAR REEDUCATION: CPT | Mod: GP | Performed by: PHYSICAL THERAPIST

## 2022-06-23 NOTE — PROGRESS NOTES
Physical Therapy Initial Evaluation  Subjective:  The history is provided by the patient. No  was used.   Patient Health History  Nohelia Wong being seen for abdominal pain/tightness s/p hernia repair and hysterectomy.     Problem began: 3/21/2022.   Problem occurred: s/p hernia repair and hysterectomy   Pain is reported as 2/10 on pain scale.  General health as reported by patient is good.  Pertinent medical history includes: anemia and thyroid problems.   Red flags:  None as reported by patient.  Medical allergies: none.   Surgeries include:  Other. Other surgery history details:  x3, knee scope, hysterectomy and hernia repair.    Current medications:  Sleep medication and thyroid medication.    Current occupation is none.                     Therapist Generated HPI Evaluation  Problem details: Patient had a hysterectomy and hernia repair on 2022.  She has had abdominal discomfort and tightness since surgery that is limiting her activity level along with not being sure of what she can do safely.  She recently saw her MD who said things are healing well and there is not risk of her tearing anything with activity.     Nohelia Wong is a 51 year old female with weakness (abdominal pain/tightness) condition which occurred with Associated condition requiring pt: surgery..   This is a new condition.  Where condition occurred: for unknown reasons.        Site of Pain: abdominals extending from lower abs up to sternum. Pain quality: tightness. and is intermittent.  Pain timing: no impact time of day.  Associated with: abdominal tightness and weakness.  Exacerbated by: extending back to sit more upright, walking--still cannot walk as fast as she did previously, unable to do previous workouts, limited lifting.  Relieved by: avoiding aggravators.                      Since onset symptoms are gradually improving.  Imaging testing: none.  Past treatment: none.       Work activity  restrictions: no job currently.    Barriers include:  None as reported by patient.                        Objective:  System         Lumbar/SI Evaluation  ROM:    AROM Lumbar:   Flexion:          Nil loss, NE  Ext:                    Ridge loss, limited by abdominal tightness   Side Bend:        Left:     Right:   Rotation:           Left:     Right:   Side Glide:        Left:  Nil loss, NE    Right:  Nil loss, NE                                                                             General Evaluation:      Gross Strength:  Gross strength wnl general: unable to activat transverse abdominus without significant cueing--able to elicit fair contraction with much cueing and repetition in clinic.                    Palpation:  Palpation wnl general: incision well-healed, no signs of infection.                                                         ROS    Assessment/Plan:    Patient is a 51 year old female with abdominal complaints s/p hernia repair and hysterectomy on 03/21/2022.  She has complaints of abdominal tightness and weakness since the surgery.  She has poor activation of transverse abdominus and limited mobility due to abdominal tightness.  She should make good progress with treatment focusing on core strengthening in addition to improving lumbar and pelvic mobility for improved function and activity levels.      Patient has the following significant findings with corresponding treatment plan.                Diagnosis 1:  Abdominal pain/tightness s/p hernia repair and hysterectomy  Pain -  self management, education and home program  Decreased ROM/flexibility - therapeutic exercise and home program  Decreased strength - therapeutic exercise, therapeutic activities and home program  Impaired muscle performance - neuro re-education and home program  Decreased function - therapeutic activities and home program  Impaired posture - neuro re-education and home program    Cumulative Therapy Evaluation is: Low  complexity.    Previous and current functional limitations:  (See Goal Flow Sheet for this information)    Short term and Long term goals: (See Goal Flow Sheet for this information)     Communication ability:  Patient appears to be able to clearly communicate and understand verbal and written communication and follow directions correctly.  Treatment Explanation - The following has been discussed with the patient:   RX ordered/plan of care  Anticipated outcomes  Possible risks and side effects  This patient would benefit from PT intervention to resume normal activities.   Rehab potential is good.    Frequency:  1 X week, once daily  Duration:  for 6 weeks  Discharge Plan:  Achieve all LTG.  Independent in home treatment program.  Reach maximal therapeutic benefit.    Please refer to the daily flowsheet for treatment today, total treatment time and time spent performing 1:1 timed codes.

## 2022-07-01 ENCOUNTER — THERAPY VISIT (OUTPATIENT)
Dept: PHYSICAL THERAPY | Facility: CLINIC | Age: 52
End: 2022-07-01
Payer: COMMERCIAL

## 2022-07-01 DIAGNOSIS — R10.84 ABDOMINAL PAIN, GENERALIZED: ICD-10-CM

## 2022-07-01 DIAGNOSIS — R19.8 ABDOMINAL TIGHTNESS: Primary | ICD-10-CM

## 2022-07-01 PROCEDURE — 97140 MANUAL THERAPY 1/> REGIONS: CPT | Mod: GP | Performed by: PHYSICAL THERAPIST

## 2022-07-01 PROCEDURE — 97110 THERAPEUTIC EXERCISES: CPT | Mod: GP | Performed by: PHYSICAL THERAPIST

## 2022-07-07 ENCOUNTER — THERAPY VISIT (OUTPATIENT)
Dept: PHYSICAL THERAPY | Facility: CLINIC | Age: 52
End: 2022-07-07
Payer: COMMERCIAL

## 2022-07-07 DIAGNOSIS — R10.84 ABDOMINAL PAIN, GENERALIZED: ICD-10-CM

## 2022-07-07 DIAGNOSIS — R19.8 ABDOMINAL TIGHTNESS: Primary | ICD-10-CM

## 2022-07-07 PROCEDURE — 97110 THERAPEUTIC EXERCISES: CPT | Mod: GP | Performed by: PHYSICAL THERAPIST

## 2022-07-07 PROCEDURE — 97140 MANUAL THERAPY 1/> REGIONS: CPT | Mod: GP | Performed by: PHYSICAL THERAPIST

## 2022-08-04 ENCOUNTER — THERAPY VISIT (OUTPATIENT)
Dept: PHYSICAL THERAPY | Facility: CLINIC | Age: 52
End: 2022-08-04
Payer: COMMERCIAL

## 2022-08-04 DIAGNOSIS — R19.8 ABDOMINAL TIGHTNESS: Primary | ICD-10-CM

## 2022-08-04 DIAGNOSIS — R10.84 ABDOMINAL PAIN, GENERALIZED: ICD-10-CM

## 2022-08-04 PROCEDURE — 97530 THERAPEUTIC ACTIVITIES: CPT | Mod: GP | Performed by: PHYSICAL THERAPIST

## 2022-08-04 PROCEDURE — 97140 MANUAL THERAPY 1/> REGIONS: CPT | Mod: 59 | Performed by: PHYSICAL THERAPIST

## 2022-08-04 PROCEDURE — 97110 THERAPEUTIC EXERCISES: CPT | Mod: 59 | Performed by: PHYSICAL THERAPIST

## 2022-08-25 ENCOUNTER — THERAPY VISIT (OUTPATIENT)
Dept: PHYSICAL THERAPY | Facility: CLINIC | Age: 52
End: 2022-08-25
Payer: COMMERCIAL

## 2022-08-25 DIAGNOSIS — R19.8 ABDOMINAL TIGHTNESS: Primary | ICD-10-CM

## 2022-08-25 DIAGNOSIS — R10.84 ABDOMINAL PAIN, GENERALIZED: ICD-10-CM

## 2022-08-25 PROCEDURE — 97140 MANUAL THERAPY 1/> REGIONS: CPT | Mod: GP | Performed by: PHYSICAL THERAPIST

## 2022-08-25 PROCEDURE — 97110 THERAPEUTIC EXERCISES: CPT | Mod: GP | Performed by: PHYSICAL THERAPIST

## 2022-08-25 NOTE — PROGRESS NOTES
"Subjective:  HPI  Physical Exam                    Objective:  System    Physical Exam    General     ROS    Assessment/Plan:    PROGRESS  REPORT    Progress reporting period is from 06/23/2022 to 08/25/2022.       SUBJECTIVE  Subjective: Patient reports not doing her exercises as much lately.  She does some of them, but she skips others due to not feeling like they work her as much.  She can sit much better and upright now without getting sore--at least 1 hour without problems.  She can walk for about 1.5 miles before getting fatigued and mild soreness in LB and abs.    Current Pain level: 0/10.     Initial Pain level: 2/10.   Changes in function:  Yes, improved sitting tolerance.   Adverse reaction to treatment or activity: None    OBJECTIVE  Objective: Lumbar AROM:  flexion min loss, NE; extension mod loss \"stiff\".  Improved extension after REIL and extension mobs.  Progressed core strength without issue.     ASSESSMENT/PLAN  Patient has been seen for 5 visits with treatment focusing on lumbar extension mobility and core strengthening exercises s/p hernia repair and hysterectomy on 03/21/2022.  She has made good progress since her initial visit with improvements noted with mobility, strength, and overall function.  She would benefit from additional visits to continue progression in these areas for increased activity levels and overall function.    Updated problem list and treatment plan: Diagnosis 1:  Abdominal pain/tightness s/p hernia repair and hysterectomy    Pain -  self management, education, directional preference exercise and home program  Decreased ROM/flexibility - manual therapy, therapeutic exercise and home program  Decreased strength - therapeutic exercise, therapeutic activities and home program  Decreased function - therapeutic activities and home program  Impaired posture - neuro re-education and home program  STG/LTGs have been met or progress has been made towards goals:  Yes (See Goal flow " sheet completed today.)  Assessment of Progress: The patient's condition is improving.  Self Management Plans:  Patient has been instructed in a home treatment program.  Patient  has been instructed in self management of symptoms.  I have re-evaluated this patient and find that the nature, scope, duration and intensity of the therapy is appropriate for the medical condition of the patient.  Nohelia continues to require the following intervention to meet STG and LTG's:  PT    Recommendations:  This patient would benefit from continued therapy.     Frequency:  2 X a month, once daily  Duration:  for 2 months        Please refer to the daily flowsheet for treatment today, total treatment time and time spent performing 1:1 timed codes.

## 2022-09-22 ENCOUNTER — OFFICE VISIT (OUTPATIENT)
Dept: SURGERY | Facility: CLINIC | Age: 52
End: 2022-09-22
Payer: COMMERCIAL

## 2022-09-22 VITALS
DIASTOLIC BLOOD PRESSURE: 70 MMHG | SYSTOLIC BLOOD PRESSURE: 110 MMHG | BODY MASS INDEX: 24.11 KG/M2 | HEIGHT: 66 IN | HEART RATE: 107 BPM | WEIGHT: 150 LBS

## 2022-09-22 DIAGNOSIS — Z09 SURGERY FOLLOW-UP EXAMINATION: Primary | ICD-10-CM

## 2022-09-22 PROCEDURE — 99024 POSTOP FOLLOW-UP VISIT: CPT | Performed by: SURGERY

## 2022-09-22 NOTE — PROGRESS NOTES
Patient returns in ongoing long-term follow-up after abdominal wall reconstruction.  Since the time of her last visit she has completed physical therapy.  She is improving.  She has returned to working out.  She has slowly been regaining confidence in her core musculature.  Things are progressing nicely.  She does still have some intermittent superficial discomfort in the hips bilaterally particularly if she bumps her hips.  Some pins-and-needles sensation in the abdominal wall skin.    On examination: Her incision is well-healed.  There is good approximation of the abdominal wall muscles and no evidence of hernia recurrence.    Overall she is making good strides to long-term recovery.  Needs to continue to work on core strength and rehabilitation.  She feels confident that she is on the right path.  I do as well.  I think at this point she can follow-up with me on an as-needed basis.

## 2022-11-21 ENCOUNTER — HEALTH MAINTENANCE LETTER (OUTPATIENT)
Age: 52
End: 2022-11-21

## 2023-04-23 NOTE — PROGRESS NOTES
Patient is discharged from PT per progress note dated 08/25/2022 as she did not return for further follow-up visits.

## 2023-04-28 ENCOUNTER — LAB REQUISITION (OUTPATIENT)
Dept: LAB | Facility: CLINIC | Age: 53
End: 2023-04-28

## 2023-04-28 DIAGNOSIS — Z01.419 ENCOUNTER FOR GYNECOLOGICAL EXAMINATION (GENERAL) (ROUTINE) WITHOUT ABNORMAL FINDINGS: ICD-10-CM

## 2023-04-28 LAB
CHOLEST SERPL-MCNC: 204 MG/DL
ERYTHROCYTE [DISTWIDTH] IN BLOOD BY AUTOMATED COUNT: 12.8 % (ref 10–15)
HCT VFR BLD AUTO: 45.5 % (ref 35–47)
HDLC SERPL-MCNC: 90 MG/DL
HGB BLD-MCNC: 14.5 G/DL (ref 11.7–15.7)
LDLC SERPL CALC-MCNC: 103 MG/DL
MCH RBC QN AUTO: 30.2 PG (ref 26.5–33)
MCHC RBC AUTO-ENTMCNC: 31.9 G/DL (ref 31.5–36.5)
MCV RBC AUTO: 95 FL (ref 78–100)
NONHDLC SERPL-MCNC: 114 MG/DL
PLATELET # BLD AUTO: 218 10E3/UL (ref 150–450)
RBC # BLD AUTO: 4.8 10E6/UL (ref 3.8–5.2)
TRIGL SERPL-MCNC: 55 MG/DL
WBC # BLD AUTO: 4.2 10E3/UL (ref 4–11)

## 2023-04-28 PROCEDURE — 80061 LIPID PANEL: CPT | Performed by: OBSTETRICS & GYNECOLOGY

## 2023-04-28 PROCEDURE — 85014 HEMATOCRIT: CPT | Performed by: OBSTETRICS & GYNECOLOGY

## 2023-06-02 ENCOUNTER — HEALTH MAINTENANCE LETTER (OUTPATIENT)
Age: 53
End: 2023-06-02

## 2024-05-03 ENCOUNTER — LAB REQUISITION (OUTPATIENT)
Dept: LAB | Facility: CLINIC | Age: 54
End: 2024-05-03

## 2024-05-03 DIAGNOSIS — E03.9 HYPOTHYROIDISM, UNSPECIFIED: ICD-10-CM

## 2024-05-03 PROCEDURE — 84443 ASSAY THYROID STIM HORMONE: CPT | Performed by: OBSTETRICS & GYNECOLOGY

## 2024-05-03 PROCEDURE — 84439 ASSAY OF FREE THYROXINE: CPT | Performed by: OBSTETRICS & GYNECOLOGY

## 2024-05-04 LAB
T4 FREE SERPL-MCNC: 1.33 NG/DL (ref 0.9–1.7)
TSH SERPL DL<=0.005 MIU/L-ACNC: 0.37 UIU/ML (ref 0.3–4.2)

## 2024-06-23 ENCOUNTER — HEALTH MAINTENANCE LETTER (OUTPATIENT)
Age: 54
End: 2024-06-23

## 2025-06-21 ENCOUNTER — HEALTH MAINTENANCE LETTER (OUTPATIENT)
Age: 55
End: 2025-06-21

## 2025-07-12 ENCOUNTER — HEALTH MAINTENANCE LETTER (OUTPATIENT)
Age: 55
End: 2025-07-12

## (undated) DEVICE — DAVINCI XI MONOPOLAR SCISSORS HOT SHEARS 8MM 470179

## (undated) DEVICE — ENDO TROCAR CONMED AIRSEAL BLADELESS 08X120MM IAS8-120LP

## (undated) DEVICE — DAVINCI XI SEAL UNIVERSAL 5-8MM 470361

## (undated) DEVICE — DRSG BANDAID 2X4" FABRIC LATEX FREE

## (undated) DEVICE — SU VICRYL 0 UR-6 27" J603H

## (undated) DEVICE — CATH TRAY FOLEY SURESTEP 16FR WDRAIN BAG STLK LATEX A300316A

## (undated) DEVICE — SU VICRYL 2-0 SH 27" J317H

## (undated) DEVICE — DRSG STERI STRIP 1/2X4" R1547

## (undated) DEVICE — ESU GROUND PAD ADULT W/CORD E7507

## (undated) DEVICE — SU SILK 2-0 FSL 18" 677G

## (undated) DEVICE — RETR ELEV / UTERINE MANIPULATOR V-CARE LG CUP 60-6085-202

## (undated) DEVICE — ESU ELEC BLADE NN-STCK PLUMEPEN PRO 360D 10FT PLPRO4020

## (undated) DEVICE — DRSG TELFA 3X8" 1238

## (undated) DEVICE — SOL WATER IRRIG 3000ML BAG 2B7117

## (undated) DEVICE — GLOVE PROTEXIS W/NEU-THERA 8.0  2D73TE80

## (undated) DEVICE — DRSG TELFA ISLAND 4X8" 7541

## (undated) DEVICE — GLOVE PROTEXIS BLUE W/NEU-THERA 7.0  2D73EB70

## (undated) DEVICE — ESU ELEC BLADE 2.75" COATED/INSULATED E1455

## (undated) DEVICE — DAVINCI XI OBTURATOR BLADELESS 8MM 470359

## (undated) DEVICE — Device

## (undated) DEVICE — DAVINCI HOT SHEARS TIP COVER  400180

## (undated) DEVICE — BNDG ABDOMINAL BINDER 9X30-45" 79-89070

## (undated) DEVICE — SUCTION IRR STRYKERFLOW II W/TIP 250-070-520

## (undated) DEVICE — DAVINCI XI DRAPE ARM 470015

## (undated) DEVICE — SUCTION CANISTER MEDIVAC LINER 3000ML W/LID 65651-530

## (undated) DEVICE — DAVINCI XI FCP BIPOLAR FENESTRATED 470205

## (undated) DEVICE — BLADE CLIPPER 4406

## (undated) DEVICE — SPONGE LAP 18X18" X8435

## (undated) DEVICE — DRAIN JACKSON PRATT 15FR ROUND SU130-1323

## (undated) DEVICE — SOL NACL 0.9% INJ 1000ML BAG 2B1324X

## (undated) DEVICE — DRAIN JACKSON PRATT RESERVOIR 100ML SU130-1305

## (undated) DEVICE — SU CHROMIC 4-0 FS-2 27" 635H

## (undated) DEVICE — LINEN TOWEL PACK X5 5464

## (undated) DEVICE — KIT PATIENT POSITIONING PIGAZZI LATEX FREE 40580

## (undated) DEVICE — PACK MINOR SBA15MIFSE

## (undated) DEVICE — SU VICRYL 3-0 SH 27" J316H

## (undated) DEVICE — SU SILK 3-0 SH 30" K832H

## (undated) DEVICE — GLOVE PROTEXIS W/NEU-THERA 7.0  2D73TE70

## (undated) DEVICE — SUCTION TIP YANKAUER W/O VENT K86

## (undated) DEVICE — TUBING CONMED AIRSEAL SMOKE EVAC INSUFFLATION ASM-EVAC

## (undated) DEVICE — PREP DURAPREP 26ML APL 8630

## (undated) DEVICE — MANIFOLD NEPTUNE 4 PORT 700-20

## (undated) DEVICE — DAVINCI XI NDL DRIVER MEGA SUTURE CUT 8MM 470309

## (undated) DEVICE — SU VICRYL 0 CT-1 27" J340H

## (undated) DEVICE — GOWN IMPERVIOUS SPECIALTY XLG/XLONG 32474

## (undated) DEVICE — SU MONOCRYL 4-0 PS-2 18" UND Y496G

## (undated) DEVICE — SU VICRYL 4-0 PS-2 18" UND J496H

## (undated) DEVICE — DAVINCI XI DRAPE COLUMN 470341

## (undated) DEVICE — PACK DAVINCI GYN SMA15GDFS1

## (undated) RX ORDER — BUPIVACAINE HYDROCHLORIDE 5 MG/ML
INJECTION, SOLUTION EPIDURAL; INTRACAUDAL
Status: DISPENSED
Start: 2022-03-21

## (undated) RX ORDER — SCOLOPAMINE TRANSDERMAL SYSTEM 1 MG/1
PATCH, EXTENDED RELEASE TRANSDERMAL
Status: DISPENSED
Start: 2022-03-21

## (undated) RX ORDER — HYDROMORPHONE HCL IN WATER/PF 6 MG/30 ML
PATIENT CONTROLLED ANALGESIA SYRINGE INTRAVENOUS
Status: DISPENSED
Start: 2022-03-21

## (undated) RX ORDER — FENTANYL CITRATE 50 UG/ML
INJECTION, SOLUTION INTRAMUSCULAR; INTRAVENOUS
Status: DISPENSED
Start: 2022-03-21

## (undated) RX ORDER — KETOROLAC TROMETHAMINE 30 MG/ML
INJECTION, SOLUTION INTRAMUSCULAR; INTRAVENOUS
Status: DISPENSED
Start: 2022-03-21

## (undated) RX ORDER — ACETAMINOPHEN 325 MG/1
TABLET ORAL
Status: DISPENSED
Start: 2022-03-21

## (undated) RX ORDER — LIDOCAINE HYDROCHLORIDE 20 MG/ML
INJECTION, SOLUTION EPIDURAL; INFILTRATION; INTRACAUDAL; PERINEURAL
Status: DISPENSED
Start: 2022-03-21

## (undated) RX ORDER — HYDROMORPHONE HYDROCHLORIDE 1 MG/ML
INJECTION, SOLUTION INTRAMUSCULAR; INTRAVENOUS; SUBCUTANEOUS
Status: DISPENSED
Start: 2022-03-21

## (undated) RX ORDER — CEFAZOLIN SODIUM/WATER 2 G/20 ML
SYRINGE (ML) INTRAVENOUS
Status: DISPENSED
Start: 2022-03-21

## (undated) RX ORDER — FENTANYL CITRATE 0.05 MG/ML
INJECTION, SOLUTION INTRAMUSCULAR; INTRAVENOUS
Status: DISPENSED
Start: 2022-03-21

## (undated) RX ORDER — NEOSTIGMINE METHYLSULFATE 1 MG/ML
VIAL (ML) INJECTION
Status: DISPENSED
Start: 2022-03-21

## (undated) RX ORDER — CYCLOBENZAPRINE HCL 10 MG
TABLET ORAL
Status: DISPENSED
Start: 2022-03-21

## (undated) RX ORDER — PROPOFOL 10 MG/ML
INJECTION, EMULSION INTRAVENOUS
Status: DISPENSED
Start: 2022-03-21

## (undated) RX ORDER — GLYCOPYRROLATE 0.2 MG/ML
INJECTION, SOLUTION INTRAMUSCULAR; INTRAVENOUS
Status: DISPENSED
Start: 2022-03-21

## (undated) RX ORDER — BUPIVACAINE HYDROCHLORIDE 2.5 MG/ML
INJECTION, SOLUTION EPIDURAL; INFILTRATION; INTRACAUDAL
Status: DISPENSED
Start: 2022-03-21

## (undated) RX ORDER — DEXAMETHASONE SODIUM PHOSPHATE 4 MG/ML
INJECTION, SOLUTION INTRA-ARTICULAR; INTRALESIONAL; INTRAMUSCULAR; INTRAVENOUS; SOFT TISSUE
Status: DISPENSED
Start: 2022-03-21

## (undated) RX ORDER — EPINEPHRINE 1 MG/ML
INJECTION, SOLUTION INTRAMUSCULAR; SUBCUTANEOUS
Status: DISPENSED
Start: 2022-03-21

## (undated) RX ORDER — ONDANSETRON 2 MG/ML
INJECTION INTRAMUSCULAR; INTRAVENOUS
Status: DISPENSED
Start: 2022-03-21